# Patient Record
Sex: MALE | Race: BLACK OR AFRICAN AMERICAN | NOT HISPANIC OR LATINO
[De-identification: names, ages, dates, MRNs, and addresses within clinical notes are randomized per-mention and may not be internally consistent; named-entity substitution may affect disease eponyms.]

---

## 2018-10-24 ENCOUNTER — CHARTING TRANS (OUTPATIENT)
Dept: OTHER | Age: 43
End: 2018-10-24

## 2018-10-26 ENCOUNTER — CHARTING TRANS (OUTPATIENT)
Dept: OTHER | Age: 43
End: 2018-10-26

## 2023-04-11 RX ORDER — INSULIN ASPART 100 [IU]/ML
20 INJECTION, SOLUTION INTRAVENOUS; SUBCUTANEOUS
COMMUNITY

## 2023-04-13 ENCOUNTER — HOSPITAL ENCOUNTER (OUTPATIENT)
Facility: HOSPITAL | Age: 48
Setting detail: HOSPITAL OUTPATIENT SURGERY
Discharge: HOME | End: 2023-04-13
Attending: OPHTHALMOLOGY | Admitting: OPHTHALMOLOGY
Payer: MEDICARE

## 2023-04-13 ENCOUNTER — ANESTHESIA (OUTPATIENT)
Dept: SURGERY | Facility: HOSPITAL | Age: 48
End: 2023-04-13
Payer: MEDICARE

## 2023-04-13 ENCOUNTER — ANESTHESIA EVENT (OUTPATIENT)
Dept: SURGERY | Facility: HOSPITAL | Age: 48
End: 2023-04-13
Payer: MEDICARE

## 2023-04-13 VITALS
BODY MASS INDEX: 39.17 KG/M2 | TEMPERATURE: 98 F | HEART RATE: 88 BPM | SYSTOLIC BLOOD PRESSURE: 157 MMHG | RESPIRATION RATE: 16 BRPM | HEIGHT: 75 IN | OXYGEN SATURATION: 95 % | DIASTOLIC BLOOD PRESSURE: 75 MMHG | WEIGHT: 315 LBS

## 2023-04-13 LAB
GLUCOSE BLDC GLUCOMTR-MCNC: 131 MG/DL (ref 70–99)
GLUCOSE BLDC GLUCOMTR-MCNC: 149 MG/DL (ref 70–99)

## 2023-04-13 PROCEDURE — 82962 GLUCOSE BLOOD TEST: CPT

## 2023-04-13 PROCEDURE — 08QF3ZZ REPAIR LEFT RETINA, PERCUTANEOUS APPROACH: ICD-10-PCS | Performed by: OPHTHALMOLOGY

## 2023-04-13 PROCEDURE — 08NF3ZZ RELEASE LEFT RETINA, PERCUTANEOUS APPROACH: ICD-10-PCS | Performed by: OPHTHALMOLOGY

## 2023-04-13 PROCEDURE — 08T53ZZ RESECTION OF LEFT VITREOUS, PERCUTANEOUS APPROACH: ICD-10-PCS | Performed by: OPHTHALMOLOGY

## 2023-04-13 RX ORDER — HYDROMORPHONE HYDROCHLORIDE 1 MG/ML
0.2 INJECTION, SOLUTION INTRAMUSCULAR; INTRAVENOUS; SUBCUTANEOUS EVERY 5 MIN PRN
Status: DISCONTINUED | OUTPATIENT
Start: 2023-04-13 | End: 2023-04-13

## 2023-04-13 RX ORDER — MORPHINE SULFATE 4 MG/ML
2 INJECTION, SOLUTION INTRAMUSCULAR; INTRAVENOUS EVERY 10 MIN PRN
Status: DISCONTINUED | OUTPATIENT
Start: 2023-04-13 | End: 2023-04-13

## 2023-04-13 RX ORDER — ATROPINE SULFATE 10 MG/ML
SOLUTION/ DROPS OPHTHALMIC AS NEEDED
Status: DISCONTINUED | OUTPATIENT
Start: 2023-04-13 | End: 2023-04-13 | Stop reason: HOSPADM

## 2023-04-13 RX ORDER — BALANCED SALT SOLUTION 6.4; .75; .48; .3; 3.9; 1.7 MG/ML; MG/ML; MG/ML; MG/ML; MG/ML; MG/ML
SOLUTION OPHTHALMIC AS NEEDED
Status: DISCONTINUED | OUTPATIENT
Start: 2023-04-13 | End: 2023-04-13 | Stop reason: HOSPADM

## 2023-04-13 RX ORDER — DEXTROSE MONOHYDRATE 25 G/50ML
50 INJECTION, SOLUTION INTRAVENOUS
Status: DISCONTINUED | OUTPATIENT
Start: 2023-04-13 | End: 2023-04-13 | Stop reason: HOSPADM

## 2023-04-13 RX ORDER — NICOTINE POLACRILEX 4 MG
30 LOZENGE BUCCAL
Status: DISCONTINUED | OUTPATIENT
Start: 2023-04-13 | End: 2023-04-13

## 2023-04-13 RX ORDER — NICOTINE POLACRILEX 4 MG
30 LOZENGE BUCCAL
Status: DISCONTINUED | OUTPATIENT
Start: 2023-04-13 | End: 2023-04-13 | Stop reason: HOSPADM

## 2023-04-13 RX ORDER — HYDROCODONE BITARTRATE AND ACETAMINOPHEN 5; 325 MG/1; MG/1
2 TABLET ORAL EVERY 4 HOURS PRN
Status: DISCONTINUED | OUTPATIENT
Start: 2023-04-13 | End: 2023-04-13

## 2023-04-13 RX ORDER — EPHEDRINE SULFATE 50 MG/ML
INJECTION, SOLUTION INTRAVENOUS AS NEEDED
Status: DISCONTINUED | OUTPATIENT
Start: 2023-04-13 | End: 2023-04-13 | Stop reason: SURG

## 2023-04-13 RX ORDER — NICOTINE POLACRILEX 4 MG
15 LOZENGE BUCCAL
Status: DISCONTINUED | OUTPATIENT
Start: 2023-04-13 | End: 2023-04-13

## 2023-04-13 RX ORDER — GENTAMICIN SULFATE 3 MG/ML
2 SOLUTION/ DROPS OPHTHALMIC AS NEEDED
Status: DISCONTINUED | OUTPATIENT
Start: 2023-04-13 | End: 2023-04-13 | Stop reason: HOSPADM

## 2023-04-13 RX ORDER — HYDROMORPHONE HYDROCHLORIDE 1 MG/ML
0.4 INJECTION, SOLUTION INTRAMUSCULAR; INTRAVENOUS; SUBCUTANEOUS EVERY 5 MIN PRN
Status: DISCONTINUED | OUTPATIENT
Start: 2023-04-13 | End: 2023-04-13

## 2023-04-13 RX ORDER — METOCLOPRAMIDE 10 MG/1
10 TABLET ORAL ONCE
Status: COMPLETED | OUTPATIENT
Start: 2023-04-13 | End: 2023-04-13

## 2023-04-13 RX ORDER — MORPHINE SULFATE 10 MG/ML
6 INJECTION, SOLUTION INTRAMUSCULAR; INTRAVENOUS EVERY 10 MIN PRN
Status: DISCONTINUED | OUTPATIENT
Start: 2023-04-13 | End: 2023-04-13

## 2023-04-13 RX ORDER — NALOXONE HYDROCHLORIDE 0.4 MG/ML
80 INJECTION, SOLUTION INTRAMUSCULAR; INTRAVENOUS; SUBCUTANEOUS AS NEEDED
Status: DISCONTINUED | OUTPATIENT
Start: 2023-04-13 | End: 2023-04-13

## 2023-04-13 RX ORDER — FAMOTIDINE 20 MG/1
20 TABLET, FILM COATED ORAL ONCE
Status: COMPLETED | OUTPATIENT
Start: 2023-04-13 | End: 2023-04-13

## 2023-04-13 RX ORDER — HYDROCODONE BITARTRATE AND ACETAMINOPHEN 5; 325 MG/1; MG/1
1 TABLET ORAL EVERY 4 HOURS PRN
Status: DISCONTINUED | OUTPATIENT
Start: 2023-04-13 | End: 2023-04-13

## 2023-04-13 RX ORDER — MORPHINE SULFATE 4 MG/ML
4 INJECTION, SOLUTION INTRAMUSCULAR; INTRAVENOUS EVERY 10 MIN PRN
Status: DISCONTINUED | OUTPATIENT
Start: 2023-04-13 | End: 2023-04-13

## 2023-04-13 RX ORDER — ATROPINE SULFATE 10 MG/ML
1 SOLUTION/ DROPS OPHTHALMIC AS NEEDED
Status: DISCONTINUED | OUTPATIENT
Start: 2023-04-13 | End: 2023-04-13 | Stop reason: HOSPADM

## 2023-04-13 RX ORDER — DEXTROSE MONOHYDRATE 25 G/50ML
50 INJECTION, SOLUTION INTRAVENOUS
Status: DISCONTINUED | OUTPATIENT
Start: 2023-04-13 | End: 2023-04-13

## 2023-04-13 RX ORDER — NICOTINE POLACRILEX 4 MG
15 LOZENGE BUCCAL
Status: DISCONTINUED | OUTPATIENT
Start: 2023-04-13 | End: 2023-04-13 | Stop reason: HOSPADM

## 2023-04-13 RX ORDER — HYDROMORPHONE HYDROCHLORIDE 1 MG/ML
0.6 INJECTION, SOLUTION INTRAMUSCULAR; INTRAVENOUS; SUBCUTANEOUS EVERY 5 MIN PRN
Status: DISCONTINUED | OUTPATIENT
Start: 2023-04-13 | End: 2023-04-13

## 2023-04-13 RX ORDER — SODIUM CHLORIDE, SODIUM LACTATE, POTASSIUM CHLORIDE, CALCIUM CHLORIDE 600; 310; 30; 20 MG/100ML; MG/100ML; MG/100ML; MG/100ML
INJECTION, SOLUTION INTRAVENOUS CONTINUOUS
Status: DISCONTINUED | OUTPATIENT
Start: 2023-04-13 | End: 2023-04-13

## 2023-04-13 RX ORDER — ROCURONIUM BROMIDE 10 MG/ML
INJECTION, SOLUTION INTRAVENOUS AS NEEDED
Status: DISCONTINUED | OUTPATIENT
Start: 2023-04-13 | End: 2023-04-13 | Stop reason: SURG

## 2023-04-13 RX ORDER — ACETAMINOPHEN 500 MG
1000 TABLET ORAL ONCE
Status: COMPLETED | OUTPATIENT
Start: 2023-04-13 | End: 2023-04-13

## 2023-04-13 RX ORDER — LIDOCAINE HYDROCHLORIDE 10 MG/ML
INJECTION, SOLUTION EPIDURAL; INFILTRATION; INTRACAUDAL; PERINEURAL AS NEEDED
Status: DISCONTINUED | OUTPATIENT
Start: 2023-04-13 | End: 2023-04-13 | Stop reason: SURG

## 2023-04-13 RX ORDER — ACETAMINOPHEN 325 MG/1
650 TABLET ORAL EVERY 4 HOURS PRN
Status: DISCONTINUED | OUTPATIENT
Start: 2023-04-13 | End: 2023-04-13

## 2023-04-13 RX ORDER — PHENYLEPHRINE HYDROCHLORIDE 25 MG/ML
2 SOLUTION/ DROPS OPHTHALMIC AS NEEDED
Status: DISCONTINUED | OUTPATIENT
Start: 2023-04-13 | End: 2023-04-13 | Stop reason: HOSPADM

## 2023-04-13 RX ADMIN — ROCURONIUM BROMIDE 30 MG: 10 INJECTION, SOLUTION INTRAVENOUS at 14:06:00

## 2023-04-13 RX ADMIN — ROCURONIUM BROMIDE 10 MG: 10 INJECTION, SOLUTION INTRAVENOUS at 13:55:00

## 2023-04-13 RX ADMIN — EPHEDRINE SULFATE 10 MG: 50 INJECTION, SOLUTION INTRAVENOUS at 14:23:00

## 2023-04-13 RX ADMIN — SODIUM CHLORIDE, SODIUM LACTATE, POTASSIUM CHLORIDE, CALCIUM CHLORIDE: 600; 310; 30; 20 INJECTION, SOLUTION INTRAVENOUS at 14:46:00

## 2023-04-13 RX ADMIN — LIDOCAINE HYDROCHLORIDE 50 MG: 10 INJECTION, SOLUTION EPIDURAL; INFILTRATION; INTRACAUDAL; PERINEURAL at 13:55:00

## 2023-04-13 NOTE — ANESTHESIA PROCEDURE NOTES
Airway  Date/Time: 4/13/2023 1:58 PM  Urgency: Elective    Difficult airway    General Information and Staff    Patient location during procedure: OR  Anesthesiologist: Behzad Serrato MD  Resident/CRNA: Vania Salvador CRNA  Performed: CRNA and anesthesiologist   Performed by: Vania Salvador CRNA  Authorized by: Behzad Serrato MD      Indications and Patient Condition  Indications for airway management: anesthesia  Sedation level: deep  Preoxygenated: yes  Patient position: sniffing  Mask difficulty assessment: 1 - vent by mask    Final Airway Details  Final airway type: endotracheal airway      Successful airway: ETT  Cuffed: yes   Successful intubation technique: Video laryngoscopy  Endotracheal tube insertion site: oral  Blade: Glenn  Blade size: #3  ETT size (mm): 7.5    Cormack-Lehane Classification: grade IIA - partial view of glottis  Placement verified by: chest auscultation and capnometry   Cuff volume (mL): 10  Measured from: lips  ETT to lips (cm): 23  Number of attempts at approach: 1  Number of other approaches attempted: 0

## 2023-04-13 NOTE — H&P
Patient presents for surgical approach to vitreous hemorrhage left eye. S/p laser for PDR and retinal tear. Exam Vitreous hemorrhage.   Patient understands risk of loss of vision loss of  Eye.

## 2023-04-14 NOTE — OPERATIVE REPORT
[facility]    PATIENT'S NAME: MCKAYLAZuhair Medico PHYSICIAN: Roberto Dudley MD   OPERATING PHYSICIAN: Roberto Dudley MD   PATIENT ACCOUNT#:   [de-identified]    LOCATION:  [place] [address]  MEDICAL RECORD #:   [id number]       YOB: 1975  ADMISSION DATE:       04/13/2023      OPERATION DATE:  04/13/2023    OPERATIVE REPORT    #####EDITING MAY BE REQUIRED#####    PREOPERATIVE DIAGNOSIS:  Proliferative diabetic retinopathy, vitreous hemorrhage, retinal tear status post laser photocoagulation, left eye. POSTOPERATIVE DIAGNOSIS:  Proliferative diabetic retinopathy, vitreous hemorrhage, retinal tear status post laser photocoagulation, left eye. PROCEDURE:  Vitrectomy, membrane peeling, laser photocoagulation, left eye. OPERATIVE TECHNIQUE:  Patient was placed supine on the table. Patient's left eye was prepped in the usual fashion. The 25-gauge plus trocars were placed in inferotemporal, superotemporal, superonasal quadrants. The infusion cannula's position was confirmed and infusion begun. Pars plana vitrectomy was performed. Vitreous hemorrhage was aspirated and cut without complication. Epiretinal membranes extended to patches of neovascularization superiorly and connecting to the optic nerve. A preexistent retinal hole with excellent laser photocoagulation surrounding the hole was present in the inferonasal quadrant. This was not disturbed. Using the wide-angle viewing system and contact lens, membranes were delaminated without complication. Neovascularization of the disc was removed without difficulty. Notably, the circulation in the eye was extremely attenuated. The optic nerve appeared slightly pale. Laser ______ was scattered about the periphery. Ophthalmoscopy showed no holes or tears. Trocars removed without leak. The eye was dressed with atropine, antibiotic ointment, and a pressure patch. No complications.     Dictated By Jennifer Lara Forrest Reynoso MD  d: 04/13/2023 14:50:37  t: 04/13/2023 19:14:24  [facility] [id number]  [facility] [place]/

## 2023-11-12 VITALS
HEIGHT: 75 IN | OXYGEN SATURATION: 99 % | RESPIRATION RATE: 22 BRPM | SYSTOLIC BLOOD PRESSURE: 154 MMHG | HEART RATE: 88 BPM | DIASTOLIC BLOOD PRESSURE: 105 MMHG | WEIGHT: 315 LBS | TEMPERATURE: 98 F | BODY MASS INDEX: 39.17 KG/M2

## 2023-11-12 PROCEDURE — 36415 COLL VENOUS BLD VENIPUNCTURE: CPT

## 2023-11-12 PROCEDURE — 99283 EMERGENCY DEPT VISIT LOW MDM: CPT

## 2023-11-12 PROCEDURE — 99284 EMERGENCY DEPT VISIT MOD MDM: CPT

## 2023-11-13 ENCOUNTER — HOSPITAL ENCOUNTER (EMERGENCY)
Facility: HOSPITAL | Age: 48
Discharge: HOME | End: 2023-11-13
Attending: EMERGENCY MEDICINE
Payer: MEDICARE

## 2023-11-13 DIAGNOSIS — R73.9 HYPERGLYCEMIA: ICD-10-CM

## 2023-11-13 DIAGNOSIS — S81.802A OPEN WOUND OF LEFT LOWER EXTREMITY, INITIAL ENCOUNTER: Primary | ICD-10-CM

## 2023-11-13 LAB
ANION GAP SERPL CALC-SCNC: 7 MMOL/L (ref 0–18)
BASOPHILS # BLD AUTO: 0.03 X10(3) UL (ref 0–0.2)
BASOPHILS NFR BLD AUTO: 0.4 %
BUN BLD-MCNC: 17 MG/DL (ref 9–23)
BUN/CREAT SERPL: 9.6 (ref 10–20)
CALCIUM BLD-MCNC: 8.8 MG/DL (ref 8.7–10.4)
CHLORIDE SERPL-SCNC: 98 MMOL/L (ref 98–112)
CO2 SERPL-SCNC: 26 MMOL/L (ref 21–32)
CREAT BLD-MCNC: 1.77 MG/DL
DEPRECATED RDW RBC AUTO: 42.1 FL (ref 35.1–46.3)
EGFRCR SERPLBLD CKD-EPI 2021: 47 ML/MIN/1.73M2 (ref 60–?)
EOSINOPHIL # BLD AUTO: 0.3 X10(3) UL (ref 0–0.7)
EOSINOPHIL NFR BLD AUTO: 4.4 %
ERYTHROCYTE [DISTWIDTH] IN BLOOD BY AUTOMATED COUNT: 14.7 % (ref 11–15)
GLUCOSE BLD-MCNC: 444 MG/DL (ref 70–99)
GLUCOSE BLDC GLUCOMTR-MCNC: 390 MG/DL (ref 70–99)
GLUCOSE BLDC GLUCOMTR-MCNC: 396 MG/DL (ref 70–99)
HCT VFR BLD AUTO: 36.8 %
HGB BLD-MCNC: 12 G/DL
IMM GRANULOCYTES # BLD AUTO: 0.03 X10(3) UL (ref 0–1)
IMM GRANULOCYTES NFR BLD: 0.4 %
LYMPHOCYTES # BLD AUTO: 2.47 X10(3) UL (ref 1–4)
LYMPHOCYTES NFR BLD AUTO: 36.4 %
MCH RBC QN AUTO: 25.5 PG (ref 26–34)
MCHC RBC AUTO-ENTMCNC: 32.6 G/DL (ref 31–37)
MCV RBC AUTO: 78.3 FL
MONOCYTES # BLD AUTO: 0.5 X10(3) UL (ref 0.1–1)
MONOCYTES NFR BLD AUTO: 7.4 %
NEUTROPHILS # BLD AUTO: 3.46 X10 (3) UL (ref 1.5–7.7)
NEUTROPHILS # BLD AUTO: 3.46 X10(3) UL (ref 1.5–7.7)
NEUTROPHILS NFR BLD AUTO: 51 %
OSMOLALITY SERPL CALC.SUM OF ELEC: 293 MOSM/KG (ref 275–295)
PLATELET # BLD AUTO: 284 10(3)UL (ref 150–450)
POTASSIUM SERPL-SCNC: 4.3 MMOL/L (ref 3.5–5.1)
RBC # BLD AUTO: 4.7 X10(6)UL
SODIUM SERPL-SCNC: 131 MMOL/L (ref 136–145)
WBC # BLD AUTO: 6.8 X10(3) UL (ref 4–11)

## 2023-11-13 PROCEDURE — 85025 COMPLETE CBC W/AUTO DIFF WBC: CPT | Performed by: EMERGENCY MEDICINE

## 2023-11-13 PROCEDURE — 87070 CULTURE OTHR SPECIMN AEROBIC: CPT | Performed by: EMERGENCY MEDICINE

## 2023-11-13 PROCEDURE — 82962 GLUCOSE BLOOD TEST: CPT

## 2023-11-13 PROCEDURE — 87077 CULTURE AEROBIC IDENTIFY: CPT | Performed by: EMERGENCY MEDICINE

## 2023-11-13 PROCEDURE — 80048 BASIC METABOLIC PNL TOTAL CA: CPT | Performed by: EMERGENCY MEDICINE

## 2023-11-13 PROCEDURE — 87205 SMEAR GRAM STAIN: CPT | Performed by: EMERGENCY MEDICINE

## 2023-11-13 PROCEDURE — 87186 SC STD MICRODIL/AGAR DIL: CPT | Performed by: EMERGENCY MEDICINE

## 2023-11-13 PROCEDURE — 87147 CULTURE TYPE IMMUNOLOGIC: CPT | Performed by: EMERGENCY MEDICINE

## 2023-11-13 RX ORDER — CLINDAMYCIN HYDROCHLORIDE 300 MG/1
600 CAPSULE ORAL 3 TIMES DAILY
Qty: 60 CAPSULE | Refills: 0 | Status: SHIPPED | OUTPATIENT
Start: 2023-11-13 | End: 2023-11-23

## 2023-11-13 RX ORDER — INSULIN ASPART 100 [IU]/ML
0.2 INJECTION, SOLUTION INTRAVENOUS; SUBCUTANEOUS ONCE
Status: COMPLETED | OUTPATIENT
Start: 2023-11-13 | End: 2023-11-13

## 2023-11-13 NOTE — ED INITIAL ASSESSMENT (HPI)
Patient arrived from home, states left leg swelling to stump (amputation) recent wound vac, and right lower leg was cut 1 month ago, leg now weeping, red and warm to touch. Denies pain, hx of DM.

## 2023-11-16 RX ORDER — SULFAMETHOXAZOLE AND TRIMETHOPRIM 800; 160 MG/1; MG/1
1 TABLET ORAL 2 TIMES DAILY
Qty: 14 TABLET | Refills: 0 | Status: SHIPPED | OUTPATIENT
Start: 2023-11-16 | End: 2023-11-23

## 2023-11-16 NOTE — PROGRESS NOTES
ED Culture Callback Results Review  Pharmacist reviewed culture results from ED visit     Final wound positive for  group B strep and proteus that is not susceptible to previously prescribed  Clindamycin (Cleocin) therapy. A new prescription for Bactrim 1 DS tab PO BID x 7 days was electronically sent to [facility] as discussed with Dr. Kathy Le. patient was contacted by phone, informed of the results, and educated regarding the change in therapy. patient verbalized understanding of the treatment plan and all questions were answered.         Santiago Woods, PharmD, PharmD   Emergency Medicine Pharmacist Specialist  11/16/23; 2:56 PM

## 2025-02-22 ENCOUNTER — HOSPITAL ENCOUNTER (INPATIENT)
Facility: HOSPITAL | Age: 50
End: 2025-02-22
Attending: EMERGENCY MEDICINE | Admitting: INTERNAL MEDICINE
Payer: MEDICARE

## 2025-02-22 ENCOUNTER — APPOINTMENT (OUTPATIENT)
Dept: ULTRASOUND IMAGING | Facility: HOSPITAL | Age: 50
End: 2025-02-22
Attending: EMERGENCY MEDICINE
Payer: MEDICARE

## 2025-02-22 ENCOUNTER — HOSPITAL ENCOUNTER (INPATIENT)
Facility: HOSPITAL | Age: 50
LOS: 9 days | Discharge: HOME HEALTH | End: 2025-03-04
Attending: EMERGENCY MEDICINE | Admitting: INTERNAL MEDICINE
Payer: MEDICARE

## 2025-02-22 DIAGNOSIS — M79.89 LEG SWELLING: Primary | ICD-10-CM

## 2025-02-22 DIAGNOSIS — L02.91 ABSCESS: ICD-10-CM

## 2025-02-22 LAB
ANION GAP SERPL CALC-SCNC: 7 MMOL/L (ref 0–18)
BASOPHILS # BLD AUTO: 0.05 X10(3) UL (ref 0–0.2)
BASOPHILS NFR BLD AUTO: 0.5 %
BUN BLD-MCNC: 18 MG/DL (ref 9–23)
BUN/CREAT SERPL: 9 (ref 10–20)
CALCIUM BLD-MCNC: 8.4 MG/DL (ref 8.7–10.4)
CHLORIDE SERPL-SCNC: 105 MMOL/L (ref 98–112)
CO2 SERPL-SCNC: 26 MMOL/L (ref 21–32)
CREAT BLD-MCNC: 2.01 MG/DL
DEPRECATED RDW RBC AUTO: 48.6 FL (ref 35.1–46.3)
EGFRCR SERPLBLD CKD-EPI 2021: 40 ML/MIN/1.73M2 (ref 60–?)
EOSINOPHIL # BLD AUTO: 0.22 X10(3) UL (ref 0–0.7)
EOSINOPHIL NFR BLD AUTO: 2.2 %
ERYTHROCYTE [DISTWIDTH] IN BLOOD BY AUTOMATED COUNT: 16.5 % (ref 11–15)
GLUCOSE BLD-MCNC: 104 MG/DL (ref 70–99)
HCT VFR BLD AUTO: 37.8 %
HGB BLD-MCNC: 12.4 G/DL
IMM GRANULOCYTES # BLD AUTO: 0.03 X10(3) UL (ref 0–1)
IMM GRANULOCYTES NFR BLD: 0.3 %
LYMPHOCYTES # BLD AUTO: 1.97 X10(3) UL (ref 1–4)
LYMPHOCYTES NFR BLD AUTO: 19.8 %
MCH RBC QN AUTO: 26.5 PG (ref 26–34)
MCHC RBC AUTO-ENTMCNC: 32.8 G/DL (ref 31–37)
MCV RBC AUTO: 80.8 FL
MONOCYTES # BLD AUTO: 0.83 X10(3) UL (ref 0.1–1)
MONOCYTES NFR BLD AUTO: 8.4 %
NEUTROPHILS # BLD AUTO: 6.83 X10 (3) UL (ref 1.5–7.7)
NEUTROPHILS # BLD AUTO: 6.83 X10(3) UL (ref 1.5–7.7)
NEUTROPHILS NFR BLD AUTO: 68.8 %
OSMOLALITY SERPL CALC.SUM OF ELEC: 288 MOSM/KG (ref 275–295)
PLATELET # BLD AUTO: 297 10(3)UL (ref 150–450)
POTASSIUM SERPL-SCNC: 4.5 MMOL/L (ref 3.5–5.1)
RBC # BLD AUTO: 4.68 X10(6)UL
SODIUM SERPL-SCNC: 138 MMOL/L (ref 136–145)
WBC # BLD AUTO: 9.9 X10(3) UL (ref 4–11)

## 2025-02-22 PROCEDURE — 36415 COLL VENOUS BLD VENIPUNCTURE: CPT

## 2025-02-22 PROCEDURE — 93971 EXTREMITY STUDY: CPT | Performed by: EMERGENCY MEDICINE

## 2025-02-22 PROCEDURE — 85025 COMPLETE CBC W/AUTO DIFF WBC: CPT | Performed by: EMERGENCY MEDICINE

## 2025-02-22 PROCEDURE — 99285 EMERGENCY DEPT VISIT HI MDM: CPT

## 2025-02-22 PROCEDURE — 96376 TX/PRO/DX INJ SAME DRUG ADON: CPT

## 2025-02-22 PROCEDURE — 80048 BASIC METABOLIC PNL TOTAL CA: CPT | Performed by: EMERGENCY MEDICINE

## 2025-02-22 PROCEDURE — 96375 TX/PRO/DX INJ NEW DRUG ADDON: CPT

## 2025-02-22 RX ORDER — VANCOMYCIN HYDROCHLORIDE
15 ONCE
Status: COMPLETED | OUTPATIENT
Start: 2025-02-23 | End: 2025-02-23

## 2025-02-23 PROBLEM — L02.91 ABSCESS: Status: ACTIVE | Noted: 2025-02-23

## 2025-02-23 LAB
ANION GAP SERPL CALC-SCNC: 9 MMOL/L (ref 0–18)
BASOPHILS # BLD AUTO: 0.03 X10(3) UL (ref 0–0.2)
BASOPHILS NFR BLD AUTO: 0.3 %
BUN BLD-MCNC: 17 MG/DL (ref 9–23)
BUN/CREAT SERPL: 8.6 (ref 10–20)
CALCIUM BLD-MCNC: 8 MG/DL (ref 8.7–10.4)
CHLORIDE SERPL-SCNC: 107 MMOL/L (ref 98–112)
CO2 SERPL-SCNC: 24 MMOL/L (ref 21–32)
CREAT BLD-MCNC: 1.98 MG/DL
DEPRECATED RDW RBC AUTO: 48.7 FL (ref 35.1–46.3)
EGFRCR SERPLBLD CKD-EPI 2021: 40 ML/MIN/1.73M2 (ref 60–?)
EOSINOPHIL # BLD AUTO: 0.31 X10(3) UL (ref 0–0.7)
EOSINOPHIL NFR BLD AUTO: 3.6 %
ERYTHROCYTE [DISTWIDTH] IN BLOOD BY AUTOMATED COUNT: 16.4 % (ref 11–15)
EST. AVERAGE GLUCOSE BLD GHB EST-MCNC: 148 MG/DL (ref 68–126)
GLUCOSE BLD-MCNC: 123 MG/DL (ref 70–99)
GLUCOSE BLDC GLUCOMTR-MCNC: 105 MG/DL (ref 70–99)
GLUCOSE BLDC GLUCOMTR-MCNC: 116 MG/DL (ref 70–99)
GLUCOSE BLDC GLUCOMTR-MCNC: 141 MG/DL (ref 70–99)
GLUCOSE BLDC GLUCOMTR-MCNC: 149 MG/DL (ref 70–99)
GLUCOSE BLDC GLUCOMTR-MCNC: 188 MG/DL (ref 70–99)
HBA1C MFR BLD: 6.8 % (ref ?–5.7)
HCT VFR BLD AUTO: 33.9 %
HGB BLD-MCNC: 11.1 G/DL
IMM GRANULOCYTES # BLD AUTO: 0.02 X10(3) UL (ref 0–1)
IMM GRANULOCYTES NFR BLD: 0.2 %
LYMPHOCYTES # BLD AUTO: 1.77 X10(3) UL (ref 1–4)
LYMPHOCYTES NFR BLD AUTO: 20.5 %
MCH RBC QN AUTO: 26.4 PG (ref 26–34)
MCHC RBC AUTO-ENTMCNC: 32.7 G/DL (ref 31–37)
MCV RBC AUTO: 80.7 FL
MONOCYTES # BLD AUTO: 0.87 X10(3) UL (ref 0.1–1)
MONOCYTES NFR BLD AUTO: 10.1 %
NEUTROPHILS # BLD AUTO: 5.63 X10 (3) UL (ref 1.5–7.7)
NEUTROPHILS # BLD AUTO: 5.63 X10(3) UL (ref 1.5–7.7)
NEUTROPHILS NFR BLD AUTO: 65.3 %
OSMOLALITY SERPL CALC.SUM OF ELEC: 293 MOSM/KG (ref 275–295)
PLATELET # BLD AUTO: 279 10(3)UL (ref 150–450)
POTASSIUM SERPL-SCNC: 3.9 MMOL/L (ref 3.5–5.1)
RBC # BLD AUTO: 4.2 X10(6)UL
SODIUM SERPL-SCNC: 140 MMOL/L (ref 136–145)
WBC # BLD AUTO: 8.6 X10(3) UL (ref 4–11)

## 2025-02-23 PROCEDURE — 82962 GLUCOSE BLOOD TEST: CPT

## 2025-02-23 PROCEDURE — 83036 HEMOGLOBIN GLYCOSYLATED A1C: CPT | Performed by: INTERNAL MEDICINE

## 2025-02-23 PROCEDURE — 85025 COMPLETE CBC W/AUTO DIFF WBC: CPT | Performed by: INTERNAL MEDICINE

## 2025-02-23 PROCEDURE — 96365 THER/PROPH/DIAG IV INF INIT: CPT

## 2025-02-23 PROCEDURE — 80048 BASIC METABOLIC PNL TOTAL CA: CPT | Performed by: INTERNAL MEDICINE

## 2025-02-23 PROCEDURE — 87040 BLOOD CULTURE FOR BACTERIA: CPT | Performed by: EMERGENCY MEDICINE

## 2025-02-23 RX ORDER — DOXEPIN HYDROCHLORIDE 50 MG/1
1 CAPSULE ORAL DAILY
Status: DISCONTINUED | OUTPATIENT
Start: 2025-02-23 | End: 2025-03-04

## 2025-02-23 RX ORDER — METOCLOPRAMIDE HYDROCHLORIDE 5 MG/ML
10 INJECTION INTRAMUSCULAR; INTRAVENOUS 3 TIMES DAILY PRN
Status: DISCONTINUED | OUTPATIENT
Start: 2025-02-23 | End: 2025-02-23

## 2025-02-23 RX ORDER — LOSARTAN POTASSIUM 50 MG/1
50 TABLET ORAL DAILY
COMMUNITY
Start: 2024-12-09 | End: 2025-12-09

## 2025-02-23 RX ORDER — HYDROMORPHONE HYDROCHLORIDE 1 MG/ML
1 INJECTION, SOLUTION INTRAMUSCULAR; INTRAVENOUS; SUBCUTANEOUS EVERY 4 HOURS PRN
Status: DISCONTINUED | OUTPATIENT
Start: 2025-02-23 | End: 2025-03-01

## 2025-02-23 RX ORDER — METOCLOPRAMIDE HYDROCHLORIDE 5 MG/ML
10 INJECTION INTRAMUSCULAR; INTRAVENOUS 3 TIMES DAILY
Status: DISCONTINUED | OUTPATIENT
Start: 2025-02-23 | End: 2025-02-23

## 2025-02-23 RX ORDER — ATORVASTATIN CALCIUM 40 MG/1
40 TABLET, FILM COATED ORAL DAILY
COMMUNITY
Start: 2024-03-14

## 2025-02-23 RX ORDER — HYDROMORPHONE HYDROCHLORIDE 1 MG/ML
0.5 INJECTION, SOLUTION INTRAMUSCULAR; INTRAVENOUS; SUBCUTANEOUS EVERY 6 HOURS PRN
Status: DISCONTINUED | OUTPATIENT
Start: 2025-02-23 | End: 2025-02-23

## 2025-02-23 RX ORDER — AMLODIPINE BESYLATE 5 MG/1
5 TABLET ORAL DAILY
Status: DISCONTINUED | OUTPATIENT
Start: 2025-02-23 | End: 2025-03-04

## 2025-02-23 RX ORDER — LOSARTAN POTASSIUM 50 MG/1
50 TABLET ORAL DAILY
Status: DISCONTINUED | OUTPATIENT
Start: 2025-02-23 | End: 2025-03-04

## 2025-02-23 RX ORDER — HYDROMORPHONE HYDROCHLORIDE 1 MG/ML
0.5 INJECTION, SOLUTION INTRAMUSCULAR; INTRAVENOUS; SUBCUTANEOUS
Status: DISCONTINUED | OUTPATIENT
Start: 2025-02-23 | End: 2025-02-23

## 2025-02-23 RX ORDER — VANCOMYCIN HYDROCHLORIDE
15 EVERY 24 HOURS
Status: DISCONTINUED | OUTPATIENT
Start: 2025-02-23 | End: 2025-02-26

## 2025-02-23 RX ORDER — AMLODIPINE BESYLATE 5 MG/1
5 TABLET ORAL DAILY
COMMUNITY
Start: 2024-09-04

## 2025-02-23 RX ORDER — METOCLOPRAMIDE HYDROCHLORIDE 5 MG/ML
5 INJECTION INTRAMUSCULAR; INTRAVENOUS 3 TIMES DAILY PRN
Status: DISCONTINUED | OUTPATIENT
Start: 2025-02-23 | End: 2025-03-04

## 2025-02-23 RX ORDER — INSULIN ASPART 100 [IU]/ML
20 INJECTION, SOLUTION INTRAVENOUS; SUBCUTANEOUS
Status: DISCONTINUED | OUTPATIENT
Start: 2025-02-23 | End: 2025-02-23

## 2025-02-23 RX ORDER — ATORVASTATIN CALCIUM 40 MG/1
40 TABLET, FILM COATED ORAL NIGHTLY
Status: DISCONTINUED | OUTPATIENT
Start: 2025-02-23 | End: 2025-03-04

## 2025-02-23 RX ORDER — ONDANSETRON 2 MG/ML
4 INJECTION INTRAMUSCULAR; INTRAVENOUS EVERY 6 HOURS PRN
Status: DISCONTINUED | OUTPATIENT
Start: 2025-02-23 | End: 2025-03-04

## 2025-02-23 NOTE — PLAN OF CARE
Alejandro continues with IV antibiotics. ID following. Voiding. IV dilaudid as needed for pain. Monitoring blood sugars, requesting insulin based on accu check. Dr. Keller aware. Plan for home when cleared for discharge.     Problem: Patient Centered Care  Goal: Patient preferences are identified and integrated in the patient's plan of care  Description: Interventions:  - What would you like us to know as we care for you? \"I have two grand daughters.\"  - Provide timely, complete, and accurate information to patient/family  - Incorporate patient and family knowledge, values, beliefs, and cultural backgrounds into the planning and delivery of care  - Encourage patient/family to participate in care and decision-making at the level they choose  - Honor patient and family perspectives and choices  Outcome: Progressing        Problem: PAIN - ADULT  Goal: Verbalizes/displays adequate comfort level or patient's stated pain goal  Description: INTERVENTIONS:  - Encourage pt to monitor pain and request assistance  - Assess pain using appropriate pain scale  - Administer analgesics based on type and severity of pain and evaluate response  - Implement non-pharmacological measures as appropriate and evaluate response  - Consider cultural and social influences on pain and pain management  - Manage/alleviate anxiety  - Utilize distraction and/or relaxation techniques  - Monitor for opioid side effects  - Notify MD/LIP if interventions unsuccessful or patient reports new pain  - Anticipate increased pain with activity and pre-medicate as appropriate  Outcome: Progressing

## 2025-02-23 NOTE — ED PROVIDER NOTES
Patient Seen in: [facility]    History     Chief Complaint   Patient presents with    Swelling Edema       HPI    History is provided by patient/independent historian: Patient  49 year old male with history of remote left BKA, with subsequent infection with stump osteomyelitis and MRSA abscess s/p course of oral antibiotics, off for the last month, here with complaints of left leg stump swelling.  Patient states that he has a history of a fluid collection there that was drained and needed  antibiotics for a long time.  He feels like the same thing is happening.  He has some scabs overlying his stump that he pulled off and believes that is how it got infected.  No fevers, no spontaneous drainage.    History reviewed.   Past Medical History:    Deep vein thrombosis (HCC)    from PICC line; no anticoagulants    Diabetes (HCC)    Visual impairment    L eye; glasses         History reviewed.   Past Surgical History:   Procedure Laterality Date    Below knee leg amputation Left 2019    estimated         Home Medications reviewed :  Prescriptions Prior to Admission[1]      History reviewed.   Social History     Socioeconomic History    Marital status: Legally    Tobacco Use    Smoking status: Never    Smokeless tobacco: Never   Vaping Use    Vaping status: Never Used   Substance and Sexual Activity    Alcohol use: Not Currently    Drug use: Never         ROS  Review of Systems   Respiratory:  Negative for shortness of breath.    Cardiovascular:  Negative for chest pain.   Skin:  Positive for wound.   All other systems reviewed and are negative.     All other pertinent organ systems are reviewed and are negative.      Physical Exam     ED Triage Vitals [02/22/25 2103]   BP (!) 147/98   Pulse 97   Resp 18   Temp 98.6 °F (37 °C)   Temp src Oral   SpO2 98 %   O2 Device None (Room air)     Vital signs reviewed.      Physical Exam  Vitals and nursing note reviewed.   Cardiovascular:       Pulses: Normal pulses.   Pulmonary:      Effort: No respiratory distress.   Abdominal:      General: There is no distension.   Musculoskeletal:      Comments: Status post left BKA, stump with overlying superficial wound, no spontaneous drainage, no surrounding cellulitis, not significantly warm to touch   Neurological:      Mental Status: He is alert.         ED Course       Labs:     Labs Reviewed   CBC WITH DIFFERENTIAL WITH PLATELET - Abnormal; Notable for the following components:       Result Value    HGB 12.4 (*)     HCT 37.8 (*)     RDW-SD 48.6 (*)     RDW 16.5 (*)     All other components within normal limits   BASIC METABOLIC PANEL (8) - Abnormal; Notable for the following components:    Glucose 104 (*)     Creatinine 2.01 (*)     BUN/CREA Ratio 9.0 (*)     Calcium, Total 8.4 (*)     eGFR-Cr 40 (*)     All other components within normal limits   BLOOD CULTURE   BLOOD CULTURE         My EKG Interpretation:   As reviewed and Interpreted by me      Imaging Results Available and Reviewed while in ED:   US VENOUS DOPPLER LEG LEFT - DIAG IMG (CPT=93971)    Result Date: 2/22/2025  CONCLUSION:  1. No left lower extremity DVT. 2. Edema and a small complex fluid collection in the soft tissues adjacent to the amputation stump     Dictated by (CST): Jeremiah Liang MD on 2/22/2025 at 11:13 PM     Finalized by (CST): Jeremiah Liang MD on 2/22/2025 at 11:16 PM         My review and independent interpretation of US images: + fluid collection. Radiology report corroborates this in addition to other details as reported by them.      Decision rules/scores evaluated: none      Diagnostic labs/tests considered but not ordered: aerobic culture    ED Medications Administered:   Medications   vancomycin (Vancocin) 1.5 g in sodium chloride 0.9% 250mL IVPB premix (has no administration in time range)   piperacillin-tazobactam (Zosyn) 4.5 g in dextrose 5% 100 mL IVPB-ADDV (has no administration in time range)   fentaNYL (Sublimaze)  50 mcg/mL injection 50 mcg (50 mcg Intravenous Given 2/22/25 9484)   fentaNYL (Sublimaze) 50 mcg/mL injection 50 mcg (50 mcg Intravenous Given 2/22/25 8947)            - no overlying fluctuance to  guide aspiration - will start broad spectrum antibiotics    MDM       Medical Decision Making      Differential Diagnosis: After obtaining the patient's history, performing the physical exam and reviewing the diagnostics, multiple initial diagnoses were considered based on the presenting problem including DVT, peripheral edema, fluid collection, cellulitis    External document review: I personally reviewed available external medical records for any recent pertinent discharge summaries, testing, and procedures - the findings are as follows: 12/1/24 admission for LLE cellulitis    Complicating Factors: The patient already  has a past medical history of Deep vein thrombosis (HCC), Diabetes (HCC), and Visual impairment. to contribute to the complexity of this ED evaluation.    Procedures performed: none    Discussed management with physician/appropriate source: Dr. Keller, Dr. William    Considered admission/deescalation of care for: none    Social determinants of health affecting patient care: none    Prescription medications considered: discussed continuing current medication regimen    The patient requires continuous monitoring for: left leg swelling    Shared decision making: discussed possible admission      Disposition and Plan     Clinical Impression:  1. Leg swelling    2. Abscess        Disposition:  Admit    Follow-up:  No follow-up provider specified.    Medications Prescribed:  Current Discharge Medication List          Hospital Problems       Present on Admission             ICD-10-CM Noted POA    * (Principal) Leg swelling M79.89 2/22/2025 Unknown                     [1] (Not in a hospital admission)

## 2025-02-23 NOTE — CONSULTS
[facility]  part of [facility] ID CONSULT NOTE    Alejandro Mireles Jr. Patient Status:  Inpatient    1975 MRN [id number]   Location [facility] 4W/SW/SE Attending Mary Keller MD   Hosp Day # 0 PCP Fernanda Chapman MD       Reason for Consultation:  L BKA infection    ASSESSMENT:    Antibiotics: Vancomycin, Cefepime    # L BKA stump abscess    -  MRI  with distal tibia OM involving osteotomy margin + abscess (1.9 x 1.8cm) w/ sinus tract to skin, s/p bedside aspiration, cx ngtd; drainage cx- S.agalactiae, diptheroids   -  Refused IV abx given hx PICC assc DVT, s/p augmentin through    - Seen again on doppler US  (4.8 x 5.3 x 4.5cm)    # Hx R TMA abscess 2024- s/p I&D, ertapenem, dc 24  # Hx BLE OM (s/p L BKA , R TMA )    # DM, CKD  # HTN     PLAN:    -  Start iv vancomycin + cefepime  -  FU bcx  -  Ortho eval   -  Local wound care. BS control.  -  Follow fever curve, wbc  -  Reviewed labs, micro, imaging reports, available old records  -  D/w patient, RN    History of Present Illness:  Alejandro Mireles Jr. is a a(n) 49 year old male with PMH HTN, DM w/neuropathy/nephropathy, PAD, LLE OM (s/p L BKA ), R forefoot OM (s/p R TMA ), c/b R TMA OM/abscess (s/p I&D 24, ertapenem (EOT 24)c/b PICC assc RUE DVT))    Pt admitted at [facility] in 2024 with L BKA stump OM and abscess s/p bedside aspiration, cx ngtd. Admit drainage cx w/S.agalactiae and diptheroids. Was on zosyn. Pt was unwilling to undergo another course of iv abx d/t hx DVT and thus DC on augmentin through 25. Now presents to [facility] ER  with L stump swelling. He believes that these recurrent infections keep happening because of excessive sweating while wearing his prosthetic. There was also a scab on his stump that he recently picked off. Endorses stump associated pain and swelling but reported drainage. No fevers or chills. Pt does not want any invasive  surgical procedures or iv antibiotics. On admit, no fever. WBC 9.9. LLE dopplers neg for DVT but with abscess (4.8 x 5.3 x 4.5 cm). Bcx sent. Pt given 1x dose vancomycin + zosyn. ID consulted.     History:  Past Medical History:    Deep vein thrombosis (HCC)    from PICC line; no anticoagulants    Diabetes (HCC)    Visual impairment    L eye; glasses     Past Surgical History:   Procedure Laterality Date    Below knee leg amputation Left 2019    estimated     No family history on file.   reports that he has never smoked. He has never used smokeless tobacco. He reports that he does not currently use alcohol. He reports that he does not use drugs.    Allergies:  Allergies[1]    Medications:    Current Facility-Administered Medications:     amLODIPine (Norvasc) tab 5 mg, 5 mg, Oral, Daily    atorvastatin (Lipitor) tab 40 mg, 40 mg, Oral, Nightly    losartan (Cozaar) tab 50 mg, 50 mg, Oral, Daily    insulin aspart (NovoLOG) 100 Units/mL FlexPen 1-5 Units, 1-5 Units, Subcutaneous, TID CC    HYDROmorphone (Dilaudid) 1 MG/ML injection 0.5 mg, 0.5 mg, Intravenous, Q6H PRN    Review of Systems:  CONSTITUTIONAL:  No weight loss, weakness or fatigue.  HEENT:  Eyes:  No visual loss, blurred vision, double vision or yellow sclerae. Ears, Nose, Throat:  No hearing loss, sneezing, congestion, runny nose or sore throat.  SKIN:  No rash or itching.  CARDIOVASCULAR:  No chest pain, chest pressure or chest discomfort  RESPIRATORY:  No shortness of breath, cough or sputum.  GASTROINTESTINAL:  No anorexia, nausea, vomiting or diarrhea. No abdominal pain or blood.  GENITOURINARY:  No Burning on urination.   NEUROLOGICAL:  No headache, dizziness, syncope, paralysis, ataxia, numbness or tingling in the extremities.  MUSCULOSKELETAL:  No muscle, back pain, LLE swelling + pain    Physical Exam:  Vital signs: Blood pressure 136/83, pulse 90, temperature 98.2 °F (36.8 °C), temperature source Oral, resp. rate (!) 148, height 6' 3\" (1.905 m),  weight 278 lb (126.1 kg), SpO2 98%.    General: Alert, oriented, NAD  HEENT: Moist mucous membranes. EOMI  Neck: No lymphadenopathy.  Supple.  Cardiovascular: RRR  Respiratory: Symmetric expansion, unlabored breathing  Abdomen: Soft, nontender, nondistended.   Musculoskeletal: no sig edema  Integument: No erythema, chronic LE skin changes, R TMA well healed, L BKA stump with slight swelling, distal dime sized wound w/no drainage expressed, no erythema, min ttp     Laboratory Data: Reviewed in EMR    Microbiology: Reviewed in EMR    Radiology: Reviewed    Thank you for allowing us to participate in the care of this patient. Please do not hesitate to call if you have any questions.     We will continue to follow with you and will make further recommendations based on his progress.    SUSAN [facility]  [phone]  2/23/2025          [1] No Known Allergies

## 2025-02-23 NOTE — H&P
[facility]  part of [facility]    History and Physical    Alejandro Mireles Jr. Patient Status:  Inpatient    1975 MRN [id number]   Location [facility] 4W/SW/SE Attending Mary Keller MD   Hosp Day # 0 PCP Fernanda Chapman MD     Date:  2025  Date of Admission:  2025    History provided by:patient  HPI:     Chief Complaint   Patient presents with    Swelling Edema     49 year old male PMH of remote left BKA, with subsequent infection with stump osteomyelitis and MRSA abscess s/p course of oral antibiotics, off for the last month, here with complaints of left leg stump swelling.  Patient states that he has a history of a fluid collection there that was drained and needed  antibiotics for a long time.  He feels like the same thing is happening.  He has some scabs overlying his stump that he pulled off and believes that is how it got infected.  No fevers, no spontaneous drainage.          History     Past Medical History:    Deep vein thrombosis (HCC)    from PICC line; no anticoagulants    Diabetes (HCC)    Visual impairment    L eye; glasses     Past Surgical History:   Procedure Laterality Date    Below knee leg amputation Left     estimated     No family history on file.  Social History:  Social History     Socioeconomic History    Marital status: Legally    Tobacco Use    Smoking status: Never    Smokeless tobacco: Never   Vaping Use    Vaping status: Never Used   Substance and Sexual Activity    Alcohol use: Not Currently    Drug use: Never     Social Drivers of Health     Food Insecurity: No Food Insecurity (2025)    NCSS - Food Insecurity     Worried About Running Out of Food in the Last Year: No     Ran Out of Food in the Last Year: No   Transportation Needs: No Transportation Needs (2025)    NCSS - Transportation     Lack of Transportation: No   Housing Stability: Not At Risk (2025)    NCSS - Housing/Utilities     Has Housing:  Yes     Worried About Losing Housing: No     Unable to Get Utilities: No     Allergies/Medications:   Allergies: Allergies[1]  Medications Prior to Admission   Medication Sig    losartan 50 MG Oral Tab Take 1 tablet (50 mg total) by mouth daily.    amLODIPine 5 MG Oral Tab Take 1 tablet (5 mg total) by mouth daily.    insulin NPH & regular 70/30 (70-30) 100 Units/mL Subcutaneous Suspension Inject 20 Units into the skin Before Dinner.    atorvastatin 40 MG Oral Tab Take 1 tablet (40 mg total) by mouth daily.    insulin aspart 100 Units/mL Injection Solution Inject 20 Units into the skin 2 (two) times daily before meals.    Multiple Vitamin (ONE-A-DAY MENS OR) Take by mouth daily.       Review of Systems:     Constitutional: Negative.    HENT: Negative.     Eyes: Negative.    Respiratory: Negative.     Cardiovascular: Negative.    Gastrointestinal: Negative.    Endocrine: Negative.    Musculoskeletal: Negative.    Skin: Negative.    Allergic/Immunologic: Negative.    Neurological: Negative.    Hematological: Negative.    Psychiatric/Behavioral: Negative.         Physical Exam:   Vital Signs:  Blood pressure (!) 164/105, pulse 90, temperature 98.2 °F (36.8 °C), temperature source Oral, resp. rate 16, height 6' 3\" (1.905 m), weight 278 lb (126.1 kg), SpO2 98%.  Physical Exam      Results:     Lab Results   Component Value Date    WBC 8.6 02/23/2025    HGB 11.1 (L) 02/23/2025    HCT 33.9 (L) 02/23/2025    .0 02/23/2025    CREATSERUM 1.98 (H) 02/23/2025    BUN 17 02/23/2025     02/23/2025    K 3.9 02/23/2025     02/23/2025    CO2 24.0 02/23/2025     (H) 02/23/2025    CA 8.0 (L) 02/23/2025     US VENOUS DOPPLER LEG LEFT - DIAG IMG (CPT=93971)    Result Date: 2/22/2025  CONCLUSION:  1. No left lower extremity DVT. 2. Edema and a small complex fluid collection in the soft tissues adjacent to the amputation stump     Dictated by (CST): Jeremiah Liang MD on 2/22/2025 at 11:13 PM     Finalized by  (CST): Jeremiah Liang MD on 2/22/2025 at 11:16 PM               Assessment/Plan:   1 Left BKA stump infection on cefepime and vanco per ID  Ortho evaluation  2 HTN BP stable  3 DM monitor acccuheck  4 HLD on statin  D/W RN  MDM  Reviewed previous: labs  Total time providing critical care:  minutes. This excludes time spent performing separately reportable procedures and services.  Consults: primary care provider and orthopedics (ID)                   FELICITA KISER, MD PATRICK  2/23/2025         [1] No Known Allergies

## 2025-02-23 NOTE — PLAN OF CARE
Patient Alert and Oriented x4. Stand pivot w/o prostethic. Dilaudid for pain. L side stump open to air. Fall precautions in place. Call light and personal belongings within arms reach.   Problem: Patient Centered Care  Goal: Patient preferences are identified and integrated in the patient's plan of care  Description: Interventions:  - What would you like us to know as we care for you?   - Provide timely, complete, and accurate information to patient/family  - Incorporate patient and family knowledge, values, beliefs, and cultural backgrounds into the planning and delivery of care  - Encourage patient/family to participate in care and decision-making at the level they choose  - Honor patient and family perspectives and choices  2/23/2025 0521 by Stephen Orozco, RN  Outcome: Progressing     Problem: PAIN - ADULT  Goal: Verbalizes/displays adequate comfort level or patient's stated pain goal  Description: INTERVENTIONS:  - Encourage pt to monitor pain and request assistance  - Assess pain using appropriate pain scale  - Administer analgesics based on type and severity of pain and evaluate response  - Implement non-pharmacological measures as appropriate and evaluate response  - Consider cultural and social influences on pain and pain management  - Manage/alleviate anxiety  - Utilize distraction and/or relaxation techniques  - Monitor for opioid side effects  - Notify MD/LIP if interventions unsuccessful or patient reports new pain  - Anticipate increased pain with activity and pre-medicate as appropriate  Outcome: Progressing

## 2025-02-23 NOTE — ED QUICK NOTES
Orders for admission, patient is aware of plan and ready to go upstairs. Any questions, please call ED RN Brett at extension [phone].     Patient Covid vaccination status: Fully vaccinated     COVID Test Ordered in ED: None    COVID Suspicion at Admission: N/A    Running Infusions:  None    Mental Status/LOC at time of transport: AxOx4    Other pertinent information:   CIWA score: N/A   NIH score:  N/A

## 2025-02-23 NOTE — ED INITIAL ASSESSMENT (HPI)
Pt arrives through triage with family        complaints of increased left leg swelling. Leg was drain a couple months ago. +pain to leg  Left BKA.       Hx of Dm,

## 2025-02-23 NOTE — PROGRESS NOTES
[facility] Pharmacy Dosing Service      Initial Pharmacokinetic Consult for Vancomycin Dosing     Alejandro Mireles Jr. is a 49 year old male who is being initiated on vancomycin therapy for  L BKA infection .  Pharmacy has been asked to dose vancomycin by [place] .  The initial treatment and monitoring approach will be steady state AUC strategy.        Weight and Temperature:    Wt Readings from Last 1 Encounters:   25 126.1 kg (278 lb)        Temp Readings from Last 1 Encounters:   25 98.2 °F (36.8 °C) (Oral)      Labs:   Recent Labs   Lab 25  0630   CREATSERUM 2.01* 1.98*      Estimated Creatinine Clearance: 53.3 mL/min (A) (based on SCr of 1.98 mg/dL (H)).     Recent Labs   Lab 25  0630   WBC 9.9 8.6          The Pharmacokinetic Target is:     to 600 mg-h/L and trough <=15 mg/L    Renal Dosing Considerations:    None     Assessment/Plan:   Initial/Loading dose: Has received 1500 mg IV (15 mg/kg, capped at 2250 mg) x 1 initial dose.      Maintenance dose: Pharmacy will dose vancomycin at 1500 mg IV every 24 hours    Monitorin) Plan for vancomycin peak and trough to be obtained at steady state    2) Pharmacy will order SCr as clinically indicated to assess renal function.    3) Pharmacy will monitor for toxicity and efficacy, adjust vancomycin dose and/or frequency, and order vancomycin levels as appropriate per the Pharmacy and Therapeutics Committee approved protocol until discontinuation of the medication.       We appreciate the opportunity to assist in the care of this patient.     Sharonda Lance PharmD  2025  8:56 AM  [place]  Pharmacy Extension: [phone]

## 2025-02-24 ENCOUNTER — APPOINTMENT (OUTPATIENT)
Dept: INTERVENTIONAL RADIOLOGY/VASCULAR | Facility: HOSPITAL | Age: 50
End: 2025-02-24
Attending: CLINICAL NURSE SPECIALIST
Payer: MEDICARE

## 2025-02-24 LAB
ANION GAP SERPL CALC-SCNC: 9 MMOL/L (ref 0–18)
BUN BLD-MCNC: 20 MG/DL (ref 9–23)
BUN/CREAT SERPL: 10.6 (ref 10–20)
CALCIUM BLD-MCNC: 8.2 MG/DL (ref 8.7–10.4)
CHLORIDE SERPL-SCNC: 106 MMOL/L (ref 98–112)
CO2 SERPL-SCNC: 25 MMOL/L (ref 21–32)
CREAT BLD-MCNC: 1.88 MG/DL
DEPRECATED RDW RBC AUTO: 49.1 FL (ref 35.1–46.3)
EGFRCR SERPLBLD CKD-EPI 2021: 43 ML/MIN/1.73M2 (ref 60–?)
ERYTHROCYTE [DISTWIDTH] IN BLOOD BY AUTOMATED COUNT: 16.4 % (ref 11–15)
GLUCOSE BLD-MCNC: 162 MG/DL (ref 70–99)
GLUCOSE BLDC GLUCOMTR-MCNC: 114 MG/DL (ref 70–99)
GLUCOSE BLDC GLUCOMTR-MCNC: 134 MG/DL (ref 70–99)
GLUCOSE BLDC GLUCOMTR-MCNC: 139 MG/DL (ref 70–99)
GLUCOSE BLDC GLUCOMTR-MCNC: 167 MG/DL (ref 70–99)
GLUCOSE BLDC GLUCOMTR-MCNC: 222 MG/DL (ref 70–99)
GLUCOSE BLDC GLUCOMTR-MCNC: 97 MG/DL (ref 70–99)
HCT VFR BLD AUTO: 36.1 %
HGB BLD-MCNC: 11.4 G/DL
MCH RBC QN AUTO: 25.9 PG (ref 26–34)
MCHC RBC AUTO-ENTMCNC: 31.6 G/DL (ref 31–37)
MCV RBC AUTO: 81.9 FL
OSMOLALITY SERPL CALC.SUM OF ELEC: 296 MOSM/KG (ref 275–295)
PLATELET # BLD AUTO: 299 10(3)UL (ref 150–450)
POTASSIUM SERPL-SCNC: 4.4 MMOL/L (ref 3.5–5.1)
RBC # BLD AUTO: 4.41 X10(6)UL
SODIUM SERPL-SCNC: 140 MMOL/L (ref 136–145)
WBC # BLD AUTO: 7.2 X10(3) UL (ref 4–11)

## 2025-02-24 PROCEDURE — 82962 GLUCOSE BLOOD TEST: CPT

## 2025-02-24 PROCEDURE — 0Y9J3ZX DRAINAGE OF LEFT LOWER LEG, PERCUTANEOUS APPROACH, DIAGNOSTIC: ICD-10-PCS | Performed by: RADIOLOGY

## 2025-02-24 PROCEDURE — 87205 SMEAR GRAM STAIN: CPT | Performed by: CLINICAL NURSE SPECIALIST

## 2025-02-24 PROCEDURE — 85027 COMPLETE CBC AUTOMATED: CPT | Performed by: INTERNAL MEDICINE

## 2025-02-24 PROCEDURE — 87186 SC STD MICRODIL/AGAR DIL: CPT | Performed by: CLINICAL NURSE SPECIALIST

## 2025-02-24 PROCEDURE — 10160 PNXR ASPIR ABSC HMTMA BULLA: CPT | Performed by: RADIOLOGY

## 2025-02-24 PROCEDURE — 80048 BASIC METABOLIC PNL TOTAL CA: CPT | Performed by: INTERNAL MEDICINE

## 2025-02-24 PROCEDURE — 99211 OFF/OP EST MAY X REQ PHY/QHP: CPT

## 2025-02-24 PROCEDURE — 87070 CULTURE OTHR SPECIMN AEROBIC: CPT | Performed by: CLINICAL NURSE SPECIALIST

## 2025-02-24 PROCEDURE — 76942 ECHO GUIDE FOR BIOPSY: CPT | Performed by: RADIOLOGY

## 2025-02-24 PROCEDURE — 87075 CULTR BACTERIA EXCEPT BLOOD: CPT | Performed by: CLINICAL NURSE SPECIALIST

## 2025-02-24 PROCEDURE — 87184 SC STD DISK METHOD PER PLATE: CPT | Performed by: CLINICAL NURSE SPECIALIST

## 2025-02-24 PROCEDURE — 87147 CULTURE TYPE IMMUNOLOGIC: CPT | Performed by: CLINICAL NURSE SPECIALIST

## 2025-02-24 RX ORDER — LIDOCAINE HYDROCHLORIDE 20 MG/ML
INJECTION, SOLUTION INFILTRATION; PERINEURAL
Status: COMPLETED
Start: 2025-02-24 | End: 2025-02-24

## 2025-02-24 NOTE — PROCEDURES
[facility]  part of [facility]  Procedure Note    Alejandro Solomon Mireles Jr. Patient Status:  Inpatient    1975 MRN [id number]   Location [facility] INTERVENTIONAL SUITES Attending Mary Keller MD   Hosp Day # 1 PCP Fernanda Chapman MD     Procedure: sonographic guided left stump fluid collection aspiration    Pre-Procedure Diagnosis:  r/o infecton    Post-Procedure Diagnosis: same    Anesthesia:  Local    Findings:  small fluid collection in stump-- approximately 3 mls of bloody fluid removed      Blood Loss:  minimal       Complications:  None       Stanley Guerra MD  2025

## 2025-02-24 NOTE — PROGRESS NOTES
Patient is AxO x4, in room 412. Time out performed with Dr. Guerra and all staff present. 3 mL of lidocaine to the site for aspiration of drainage. 3 mL of sanguinous fluid aspirated. Site is clean, dry, intact. Site is without hematoma or bleeding. Specimens collected and sent. Report to MENA Cedillo.

## 2025-02-24 NOTE — PROGRESS NOTES
[facility]  part of [facility]    Progress Note    Alejandro Mireles Jr. Patient Status:  Inpatient    1975 MRN [id number]   Location [facility] 4W/SW/SE Attending Mary Keller MD   Hosp Day # 1 PCP Fernanda Chapman MD     Chief Complaint: Stump infection    Subjective:     Constitutional: Negative.    HENT: Negative.     Eyes: Negative.    Respiratory: Negative.     Cardiovascular: Negative.    Gastrointestinal: Negative.    Endocrine: Negative.    Genitourinary: Negative.    Musculoskeletal: Negative.    Skin: Negative.    Neurological: Negative.    Hematological: Negative.  Does not bruise/bleed easily.   Psychiatric/Behavioral: Negative.  Negative for agitation.        Objective:   Blood pressure (!) 148/96, pulse 82, temperature 98.4 °F (36.9 °C), temperature source Oral, resp. rate 18, height 6' 3\" (1.905 m), weight 278 lb (126.1 kg), SpO2 96%.  Physical Exam  Vitals and nursing note reviewed.   Constitutional:       Appearance: Normal appearance.   HENT:      Head: Normocephalic and atraumatic.   Eyes:      Pupils: Pupils are equal, round, and reactive to light.   Cardiovascular:      Rate and Rhythm: Normal rate and regular rhythm.   Pulmonary:      Effort: Pulmonary effort is normal.      Breath sounds: Normal breath sounds.   Abdominal:      General: Abdomen is flat.      Palpations: Abdomen is soft.   Musculoskeletal:      Cervical back: Neck supple.   Skin:     Findings: Lesion present.   Neurological:      General: No focal deficit present.      Mental Status: He is alert and oriented to person, place, and time.   Psychiatric:         Mood and Affect: Mood normal.         Results:   Lab Results   Component Value Date    WBC 7.2 2025    HGB 11.4 (L) 2025    HCT 36.1 (L) 2025    .0 2025    CREATSERUM 1.88 (H) 2025    BUN 20 2025     2025    K 4.4 2025     2025    CO2 25.0 2025    GLU  162 (H) 02/24/2025    CA 8.2 (L) 02/24/2025       US VENOUS DOPPLER LEG LEFT - DIAG IMG (CPT=93971)    Result Date: 2/22/2025  CONCLUSION:  1. No left lower extremity DVT. 2. Edema and a small complex fluid collection in the soft tissues adjacent to the amputation stump     Dictated by (CST): Jeremiah Liang MD on 2/22/2025 at 11:13 PM     Finalized by (CST): Jeremiah Liang MD on 2/22/2025 at 11:16 PM               Assessment & Plan:         1 Left BKA stump infection on cefepime and vanco   2 HTN BP stable  3 DM monitor acccuheck  4 HLD on statin  Time spent 35 mins        FELICITA KISER, MD PATRICK  2/24/2025

## 2025-02-24 NOTE — PLAN OF CARE
Patient refusing any assistance from staff despite being educated on the importance of maintaining safety precautions. Refusing assessment at this time and dressing change.

## 2025-02-24 NOTE — PROGRESS NOTES
02/24/25 1331   Wound 02/23/25 Knee Anterior;Left   Date First Assessed/Time First Assessed: 02/23/25 0133   Present on Original Admission: Yes  Primary Wound Type: Old surgical  Location: Knee  Wound Location Orientation: Anterior;Left  Wound Description (Comments): L BKA, L Stump s/p IR drainage 2/25   Wound Image     Site Assessment Fragile;Pink;Red   Drainage Amount Small   Drainage Description Sanguineous   Treatments Cleansed;Saline;Site Care   Dressing Aquacel Foam;Hydrofiber with silver   Dressing Changed Changed   Dressing Status Clean;Dry;Intact;Dressing Changed   Wound Length (cm) 0.2 cm   Wound Width (cm) 0.2 cm   Wound Surface Area (cm^2) 0.04 cm^2   Wound Depth (cm) 0.1 cm   Wound Volume (cm^3) 0.004 cm^3   Margins Attached edges   Non-staged Wound Description Full thickness   Liliana-wound Assessment Callous;Dry;Fragile;Pink   Wound Granulation Tissue Red;Pink   Wound Bed Granulation (%) 100 %   State of Healing Early/partial granulation   Wound Odor None   Tunneling? No   Undermining? No   Sinus Tracts? No   Wound Follow Up   Follow up needed No     Wound Care  Nursing consult to assess the pt's left stump wound, the pt. is agreeable for assessment. He is s/p IR stump drainage, old band aid saturated with bloody exudate, left stump cleansed with saline and gauze, Applied a piece of silver alginate and a border foam, secured in place. Per the pt. he is has some of these dressings at home. The pt. was following up in the [facility] wound clinic. All questions answered. Spoke with the nurse, the pt. hopes to discharge soon.

## 2025-02-24 NOTE — IMAGING NOTE
Ultrasound shows left stump fluid collection.  Discussed plan for aspiration, patient agrees to proceed.

## 2025-02-24 NOTE — PROGRESS NOTES
INFECTIOUS DISEASE PROGRESS NOTE  [facility]  part of [facility] ID PROGRESS NOTE    Alejandro Mireles Jr. Patient Status:  Inpatient    1975 MRN [id number]   Location [facility] 4W/SW/SE Attending Mary Keller MD   Hosp Day # 1 PCP Fernanda Chapman MD     Subjective:  ROS reviewed. Stating that he does not need surgery and states that a bone infection was \"never proven\".     ASSESSMENT:    Antibiotics: Vancomycin, Cefepime     # L BKA stump abscess               -  MRI  with distal tibia OM involving osteotomy margin + abscess (1.9 x 1.8cm) w/ sinus tract to skin, s/p bedside aspiration, cx ngtd; drainage cx- S.agalactiae, diptheroids               -  Refused IV abx given hx PICC assc DVT, s/p augmentin through                - Seen again on doppler US  (4.8 x 5.3 x 4.5cm)   -Plans for IR aspiration      # Hx R TMA abscess 2024- s/p I&D, ertapenem, dc 24  # Hx BLE OM (s/p L BKA , R TMA )     # DM, CKD  # HTN      PLAN:  -  Continue on vancomycin + cefepime. FU cx.  -  US noted. Needs vascular evaluation but states that he wound refuse surgery. Will have IR evaluate for aspiration.  -  Local wound care. Wound care to evaluate.  -  Follow fever curve, wbc  -  Reviewed labs, micro, imaging reports, available old records  -  Case d/w patient, RN.     History of Present Illness:  49 year old male with PMH HTN, DM w/neuropathy/nephropathy, PAD, LLE OM (s/p L BKA ), R forefoot OM (s/p R TMA ), c/b R TMA OM/abscess (s/p I&D 24, ertapenem (EOT 24)c/b PICC assc RUE DVT))     Pt admitted at [facility] in 2024 with L BKA stump OM and abscess s/p bedside aspiration, cx ngtd. Admit drainage cx w/S.agalactiae and diptheroids. Was on zosyn. Pt was unwilling to undergo another course of iv abx d/t hx DVT and thus DC on augmentin through 25. Now presents to [facility] ER  with L stump swelling. He believes that these recurrent  infections keep happening because of excessive sweating while wearing his prosthetic. There was also a scab on his stump that he recently picked off. Endorses stump associated pain and swelling but reported drainage. No fevers or chills. Pt does not want any invasive surgical procedures or iv antibiotics. On admit, no fever. WBC 9.9. LLE dopplers neg for DVT but with abscess (4.8 x 5.3 x 4.5 cm). Bcx sent. Pt given 1x dose vancomycin + zosyn. ID consulted.     Physical Exam:  BP (!) 148/96 (BP Location: Right arm)   Pulse 82   Temp 98.4 °F (36.9 °C) (Oral)   Resp 18   Ht 6' 3\" (1.905 m)   Wt 278 lb (126.1 kg)   SpO2 96%   BMI 34.75 kg/m²     Gen:   Awake, in bed  HEENT:  EOMI, neck supple  CV/lungs:  RRR, CTAB  Abdom:  Soft, no TTP  Skin/extrem:  L BKA stump with open area with scant drainage  Lines:  PIV+    Laboratory Data: Reviewed    Microbiology: Reviewed    Radiology: Reviewed      [facility]  [phone]  2/24/2025

## 2025-02-24 NOTE — PLAN OF CARE
Patient Alert and Oriented x4. Dilaudid for pain, uses urinal. Stand pivot. On IV abx. Fall precautions in place. Call light and personal belongings within arms reach.  Problem: Patient Centered Care  Goal: Patient preferences are identified and integrated in the patient's plan of care  Description: Interventions:  - What would you like us to know as we care for you?   - Provide timely, complete, and accurate information to patient/family  - Incorporate patient and family knowledge, values, beliefs, and cultural backgrounds into the planning and delivery of care  - Encourage patient/family to participate in care and decision-making at the level they choose  - Honor patient and family perspectives and choices  Outcome: Progressing     Problem: PAIN - ADULT  Goal: Verbalizes/displays adequate comfort level or patient's stated pain goal  Description: INTERVENTIONS:  - Encourage pt to monitor pain and request assistance  - Assess pain using appropriate pain scale  - Administer analgesics based on type and severity of pain and evaluate response  - Implement non-pharmacological measures as appropriate and evaluate response  - Consider cultural and social influences on pain and pain management  - Manage/alleviate anxiety  - Utilize distraction and/or relaxation techniques  - Monitor for opioid side effects  - Notify MD/LIP if interventions unsuccessful or patient reports new pain  - Anticipate increased pain with activity and pre-medicate as appropriate  Outcome: Progressing

## 2025-02-25 LAB
GLUCOSE BLDC GLUCOMTR-MCNC: 117 MG/DL (ref 70–99)
GLUCOSE BLDC GLUCOMTR-MCNC: 121 MG/DL (ref 70–99)
GLUCOSE BLDC GLUCOMTR-MCNC: 135 MG/DL (ref 70–99)
GLUCOSE BLDC GLUCOMTR-MCNC: 141 MG/DL (ref 70–99)
GLUCOSE BLDC GLUCOMTR-MCNC: 204 MG/DL (ref 70–99)

## 2025-02-25 PROCEDURE — 82962 GLUCOSE BLOOD TEST: CPT

## 2025-02-25 NOTE — PLAN OF CARE
Pt is A&O X4. Breathing on room air. Voiding via Urinal. Monitoring blood glucose per order. Given Dilaudid prn for pain. D.C. plan is return home when stable. Call light within reach. Safety precaution in place.    Problem: Patient Centered Care  Goal: Patient preferences are identified and integrated in the patient's plan of care  Description: Interventions:  - What would you like us to know as we care for you? I have one daughter and two granddaughters.  - Provide timely, complete, and accurate information to patient/family  - Incorporate patient and family knowledge, values, beliefs, and cultural backgrounds into the planning and delivery of care  - Encourage patient/family to participate in care and decision-making at the level they choose  - Honor patient and family perspectives and choices  Outcome: Progressing     Problem: Patient/Family Goals  Goal: Patient/Family Long Term Goal  Description: Patient's Long Term Goal:     Interventions:  -   - See additional Care Plan goals for specific interventions  Outcome: Progressing  Goal: Patient/Family Short Term Goal  Description: Patient's Short Term Goal: Pain level < 4/10    Interventions:   - Pain med prn  - See additional Care Plan goals for specific interventions  Outcome: Progressing     Problem: PAIN - ADULT  Goal: Verbalizes/displays adequate comfort level or patient's stated pain goal  Description: INTERVENTIONS:  - Encourage pt to monitor pain and request assistance  - Assess pain using appropriate pain scale  - Administer analgesics based on type and severity of pain and evaluate response  - Implement non-pharmacological measures as appropriate and evaluate response  - Consider cultural and social influences on pain and pain management  - Manage/alleviate anxiety  - Utilize distraction and/or relaxation techniques  - Monitor for opioid side effects  - Notify MD/LIP if interventions unsuccessful or patient reports new pain  - Anticipate increased pain  with activity and pre-medicate as appropriate  Outcome: Progressing     Problem: RISK FOR INFECTION - ADULT  Goal: Absence of fever/infection during anticipated neutropenic period  Description: INTERVENTIONS  - Monitor WBC  - Administer growth factors as ordered  - Implement neutropenic guidelines  Outcome: Progressing     Problem: SAFETY ADULT - FALL  Goal: Free from fall injury  Description: INTERVENTIONS:  - Assess pt frequently for physical needs  - Identify cognitive and physical deficits and behaviors that affect risk of falls.  - [place] fall precautions as indicated by assessment.  - Educate pt/family on patient safety including physical limitations  - Instruct pt to call for assistance with activity based on assessment  - Modify environment to reduce risk of injury  - Provide assistive devices as appropriate  - Consider OT/PT consult to assist with strengthening/mobility  - Encourage toileting schedule  Outcome: Progressing     Problem: DISCHARGE PLANNING  Goal: Discharge to home or other facility with appropriate resources  Description: INTERVENTIONS:  - Identify barriers to discharge w/pt and caregiver  - Include patient/family/discharge partner in discharge planning  - Arrange for needed discharge resources and transportation as appropriate  - Identify discharge learning needs (meds, wound care, etc)  - Arrange for interpreters to assist at discharge as needed  - Consider post-discharge preferences of patient/family/discharge partner  - Complete POLST form as appropriate  - Assess patient's ability to be responsible for managing their own health  - Refer to Case Management Department for coordinating discharge planning if the patient needs post-hospital services based on physician/LIP order or complex needs related to functional status, cognitive ability or social support system  Outcome: Progressing

## 2025-02-25 NOTE — PLAN OF CARE
Alejandro is up with walker to the bathroom. Dressing in place to left stump. IV dilaudid given as needed for pain. Had episode of emesis, zofran given. Accu Check ACHS.  IV antibiotics continued. Plan for home when cleared.     Problem: Patient Centered Care  Goal: Patient preferences are identified and integrated in the patient's plan of care  Description: Interventions:  - What would you like us to know as we care for you? \"I have two granddaughters.\"  - Provide timely, complete, and accurate information to patient/family  - Incorporate patient and family knowledge, values, beliefs, and cultural backgrounds into the planning and delivery of care  - Encourage patient/family to participate in care and decision-making at the level they choose  - Honor patient and family perspectives and choices  Outcome: Progressing      Problem: PAIN - ADULT  Goal: Verbalizes/displays adequate comfort level or patient's stated pain goal  Description: INTERVENTIONS:  - Encourage pt to monitor pain and request assistance  - Assess pain using appropriate pain scale  - Administer analgesics based on type and severity of pain and evaluate response  - Implement non-pharmacological measures as appropriate and evaluate response  - Consider cultural and social influences on pain and pain management  - Manage/alleviate anxiety  - Utilize distraction and/or relaxation techniques  - Monitor for opioid side effects  - Notify MD/LIP if interventions unsuccessful or patient reports new pain  - Anticipate increased pain with activity and pre-medicate as appropriate  Outcome: Progressing     Problem: RISK FOR INFECTION - ADULT  Goal: Absence of fever/infection during anticipated neutropenic period  Description: INTERVENTIONS  - Monitor WBC  - Administer growth factors as ordered  - Implement neutropenic guidelines  Outcome: Progressing     Problem: SAFETY ADULT - FALL  Goal: Free from fall injury  Description: INTERVENTIONS:  - Assess pt frequently for  physical needs  - Identify cognitive and physical deficits and behaviors that affect risk of falls.  - [place] fall precautions as indicated by assessment.  - Educate pt/family on patient safety including physical limitations  - Instruct pt to call for assistance with activity based on assessment  - Modify environment to reduce risk of injury  - Provide assistive devices as appropriate  - Consider OT/PT consult to assist with strengthening/mobility  - Encourage toileting schedule  Outcome: Progressing     Problem: DISCHARGE PLANNING  Goal: Discharge to home or other facility with appropriate resources  Description: INTERVENTIONS:  - Identify barriers to discharge w/pt and caregiver  - Include patient/family/discharge partner in discharge planning  - Arrange for needed discharge resources and transportation as appropriate  - Identify discharge learning needs (meds, wound care, etc)  - Arrange for interpreters to assist at discharge as needed  - Consider post-discharge preferences of patient/family/discharge partner  - Complete POLST form as appropriate  - Assess patient's ability to be responsible for managing their own health  - Refer to Case Management Department for coordinating discharge planning if the patient needs post-hospital services based on physician/LIP order or complex needs related to functional status, cognitive ability or social support system  Outcome: Progressing

## 2025-02-25 NOTE — PROGRESS NOTES
Physician Clarification    Additional information related to the patient's condition  CKD stage 3    This note is part of the patient's medical record.

## 2025-02-25 NOTE — PROGRESS NOTES
INFECTIOUS DISEASE PROGRESS NOTE  [facility]  part of [facility] ID PROGRESS NOTE    Alejandro Mireles Jr. Patient Status:  Inpatient    1975 MRN [id number]   Location [facility] 4W/SW/SE Attending Mary Keller MD   Hosp Day # 2 PCP Fernanda Chapman MD     Subjective:  ROS reviewed. Feels better. No complaints of pain.    ASSESSMENT:    Antibiotics: Vancomycin, Cefepime     # L BKA stump abscess with concerns for chronic OM with sinus tract               -  MRI  with distal tibia OM involving osteotomy margin + abscess (1.9 x 1.8cm) w/ sinus tract to skin, s/p bedside aspiration, cx ngtd; drainage cx- S.agalactiae, diptheroids               -  Refused IV abx given hx PICC assc DVT, s/p augmentin through                - Seen again on doppler US  (4.8 x 5.3 x 4.5cm)   -S/p IR aspiration , cx pending  # Hx R TMA abscess 2024- s/p I&D, ertapenem, dc 24  # Hx BLE OM (s/p L BKA , R TMA )  # DM, CKD  # HTN      PLAN:  -  Continue on vancomycin + cefepime. FU cx.  -  OSH MRI from 2024 shows osteomyelitis. Will need vascular surgery follow-up.  -  Local wound care.   -  Follow fever curve, wbc.  -  Reviewed labs, micro, imaging reports, available old records.  -  Case d/w patient, RN.     History of Present Illness:  49 year old male with PMH HTN, DM w/neuropathy/nephropathy, PAD, LLE OM (s/p L BKA ), R forefoot OM (s/p R TMA ), c/b R TMA OM/abscess (s/p I&D 24, ertapenem (EOT 24)c/b PICC assc RUE DVT))     Pt admitted at [facility] in 2024 with L BKA stump OM and abscess s/p bedside aspiration, cx ngtd. Admit drainage cx w/S.agalactiae and diptheroids. Was on zosyn. Pt was unwilling to undergo another course of iv abx d/t hx DVT and thus DC on augmentin through 25. Now presents to [facility] ER  with L stump swelling. He believes that these recurrent infections keep happening because of excessive sweating while  wearing his prosthetic. There was also a scab on his stump that he recently picked off. Endorses stump associated pain and swelling but reported drainage. No fevers or chills. Pt does not want any invasive surgical procedures or iv antibiotics. On admit, no fever. WBC 9.9. LLE dopplers neg for DVT but with abscess (4.8 x 5.3 x 4.5 cm). Bcx sent. Pt given 1x dose vancomycin + zosyn. ID consulted.     Physical Exam:  BP (!) 155/99 (BP Location: Left arm)   Pulse 81   Temp 98.2 °F (36.8 °C) (Oral)   Resp 17   Ht 6' 3\" (1.905 m)   Wt 278 lb (126.1 kg)   SpO2 97%   BMI 34.75 kg/m²     Gen:   Awake, in bed  HEENT:  EOMI, neck supple  CV/lungs:  RRR, CTAB  Abdom:  Soft, no TTP  Skin/extrem:  L BKA stump with open area with scant drainage  Lines:  PIV+    Laboratory Data: Reviewed    Microbiology: Reviewed    Radiology: Reviewed      SUSAN [facility]  [phone]  2/25/2025

## 2025-02-25 NOTE — PLAN OF CARE
Patient AOX4. Room air. General diet, tolerated well. ACHS. Insulin given per pt needs, MD aware. Voiding freely/ urinal.  PRN dilaudid given for pain management. Saline locked.

## 2025-02-25 NOTE — PROGRESS NOTES
[facility]  part of [facility]    Progress Note    Alejandro Mireles Jr. Patient Status:  Inpatient    1975 MRN [id number]   Location [facility] 4W/SW/SE Attending Mary Keller MD   Hosp Day # 2 PCP Fernanda Chapman MD     Chief Complaint: Stump infection    Subjective:     Constitutional: Negative.    HENT: Negative.     Eyes: Negative.    Respiratory: Negative.     Cardiovascular: Negative.    Gastrointestinal: Negative.    Endocrine: Negative.    Genitourinary: Negative.    Musculoskeletal: Negative.    Skin: Negative.    Neurological: Negative.    Hematological: Negative.  Does not bruise/bleed easily.   Psychiatric/Behavioral: Negative.  Negative for agitation.        Objective:   Blood pressure (!) 155/99, pulse 81, temperature 98.2 °F (36.8 °C), temperature source Oral, resp. rate 17, height 6' 3\" (1.905 m), weight 278 lb (126.1 kg), SpO2 97%.  Physical Exam  Vitals and nursing note reviewed.   Constitutional:       Appearance: Normal appearance.   HENT:      Head: Normocephalic and atraumatic.   Eyes:      Pupils: Pupils are equal, round, and reactive to light.   Cardiovascular:      Rate and Rhythm: Normal rate and regular rhythm.   Pulmonary:      Effort: Pulmonary effort is normal.      Breath sounds: Normal breath sounds.   Abdominal:      General: Abdomen is flat.      Palpations: Abdomen is soft.   Musculoskeletal:      Cervical back: Neck supple.   Skin:     Findings: Lesion present.   Neurological:      General: No focal deficit present.      Mental Status: He is alert and oriented to person, place, and time.   Psychiatric:         Mood and Affect: Mood normal.         Results:   Lab Results   Component Value Date    WBC 7.2 2025    HGB 11.4 (L) 2025    HCT 36.1 (L) 2025    .0 2025    CREATSERUM 1.88 (H) 2025    BUN 20 2025     2025    K 4.4 2025     2025    CO2 25.0 2025    GLU  162 (H) 02/24/2025    CA 8.2 (L) 02/24/2025       No results found.        Assessment & Plan:         1 Left BKA stump infection on cefepime and vanco   Follow cultures   2 HTN BP stable  3 DM monitor acccuheck  4 HLD on statin  Time spent 35 mins  DC home tomorrow

## 2025-02-26 LAB
ANION GAP SERPL CALC-SCNC: 8 MMOL/L (ref 0–18)
BUN BLD-MCNC: 19 MG/DL (ref 9–23)
BUN/CREAT SERPL: 10.4 (ref 10–20)
CALCIUM BLD-MCNC: 8.7 MG/DL (ref 8.7–10.4)
CHLORIDE SERPL-SCNC: 105 MMOL/L (ref 98–112)
CO2 SERPL-SCNC: 27 MMOL/L (ref 21–32)
CREAT BLD-MCNC: 1.82 MG/DL
EGFRCR SERPLBLD CKD-EPI 2021: 45 ML/MIN/1.73M2 (ref 60–?)
GLUCOSE BLD-MCNC: 157 MG/DL (ref 70–99)
GLUCOSE BLDC GLUCOMTR-MCNC: 123 MG/DL (ref 70–99)
GLUCOSE BLDC GLUCOMTR-MCNC: 139 MG/DL (ref 70–99)
GLUCOSE BLDC GLUCOMTR-MCNC: 179 MG/DL (ref 70–99)
GLUCOSE BLDC GLUCOMTR-MCNC: 81 MG/DL (ref 70–99)
OSMOLALITY SERPL CALC.SUM OF ELEC: 296 MOSM/KG (ref 275–295)
POTASSIUM SERPL-SCNC: 4.5 MMOL/L (ref 3.5–5.1)
SODIUM SERPL-SCNC: 140 MMOL/L (ref 136–145)

## 2025-02-26 PROCEDURE — 82962 GLUCOSE BLOOD TEST: CPT

## 2025-02-26 PROCEDURE — 80048 BASIC METABOLIC PNL TOTAL CA: CPT | Performed by: INTERNAL MEDICINE

## 2025-02-26 NOTE — PROGRESS NOTES
[facility]  part of [facility]    Progress Note    Alejandro Mireles Jr. Patient Status:  Inpatient    1975 MRN [id number]   Location [facility] 4W/SW/SE Attending Mary Keller MD   Hosp Day # 3 PCP Fernanda Chapman MD     Chief Complaint: Stump infection    Subjective:     Constitutional: Negative.    HENT: Negative.     Eyes: Negative.    Respiratory: Negative.     Cardiovascular: Negative.    Gastrointestinal: Negative.    Endocrine: Negative.    Genitourinary: Negative.    Musculoskeletal: Negative.    Skin: Negative.    Neurological: Negative.    Hematological: Negative.  Does not bruise/bleed easily.   Psychiatric/Behavioral: Negative.  Negative for agitation.        Objective:   Blood pressure (!) 152/92, pulse 86, temperature 98.2 °F (36.8 °C), temperature source Oral, resp. rate 18, height 6' 3\" (1.905 m), weight 278 lb (126.1 kg), SpO2 95%.  Physical Exam  Vitals and nursing note reviewed.   Constitutional:       Appearance: Normal appearance.   HENT:      Head: Normocephalic and atraumatic.   Eyes:      Pupils: Pupils are equal, round, and reactive to light.   Cardiovascular:      Rate and Rhythm: Normal rate and regular rhythm.   Pulmonary:      Effort: Pulmonary effort is normal.      Breath sounds: Normal breath sounds.   Abdominal:      General: Abdomen is flat.      Palpations: Abdomen is soft.   Musculoskeletal:      Cervical back: Neck supple.   Skin:     Findings: Lesion present.   Neurological:      General: No focal deficit present.      Mental Status: He is alert and oriented to person, place, and time.   Psychiatric:         Mood and Affect: Mood normal.         Results:   Lab Results   Component Value Date    WBC 7.2 2025    HGB 11.4 (L) 2025    HCT 36.1 (L) 2025    .0 2025    CREATSERUM 1.82 (H) 2025    BUN 19 2025     2025    K 4.5 2025     2025    CO2 27.0 2025    GLU  157 (H) 02/26/2025    CA 8.7 02/26/2025       No results found.        Assessment & Plan:         1 Left BKA stump infection on cefepime and vanco   Follow cultures   2 HTN BP stable  3 DM monitor acccuheck  4 HLD on statin  Time spent 35 mins  D/W RN  Pt had earlier refused to see vascular

## 2025-02-26 NOTE — PROGRESS NOTES
INFECTIOUS DISEASE PROGRESS NOTE  [facility]  part of [facility] ID PROGRESS NOTE    Alejandro Mireles Jr. Patient Status:  Inpatient    1975 MRN [id number]   Location [facility] 4W/SW/SE Attending Mary Keller MD   Hosp Day # 3 PCP Fernanda Chapman MD     Subjective:  ROS reviewed. Feels better. Now agreeable to vascular surgery evaluation.    ASSESSMENT:    Antibiotics: Ancef  Vancomycin, Cefepime     # L BKA stump abscess with concerns for chronic OM with sinus tract               -  MRI  with distal tibia OM involving osteotomy margin + abscess (1.9 x 1.8cm) w/ sinus tract to skin, s/p bedside aspiration, cx ngtd; drainage cx- S.agalactiae, diptheroids               -  Refused IV abx given hx PICC assc DVT, s/p augmentin through                - Seen again on doppler US  (4.8 x 5.3 x 4.5cm)   -S/p IR aspiration , cx GBS  # Hx R TMA abscess 2024- s/p I&D, ertapenem, dc 24  # Hx BLE OM (s/p L BKA , R TMA )  # DM, CKD  # HTN      PLAN:  -  Stop vancomycin + cefepime and start ancef. Patient now agreeable to vascular surgery evaluation for consideration of revision.  -  Local wound care.   -  Follow fever curve, wbc.  -  Reviewed labs, micro, imaging reports, available old records.  -  Case d/w patient, RN.     History of Present Illness:  49 year old male with PMH HTN, DM w/neuropathy/nephropathy, PAD, LLE OM (s/p L BKA ), R forefoot OM (s/p R TMA ), c/b R TMA OM/abscess (s/p I&D 24, ertapenem (EOT 24)c/b PICC assc RUE DVT))     Pt admitted at [facility] in 2024 with L BKA stump OM and abscess s/p bedside aspiration, cx ngtd. Admit drainage cx w/S.agalactiae and diptheroids. Was on zosyn. Pt was unwilling to undergo another course of iv abx d/t hx DVT and thus DC on augmentin through 25. Now presents to [facility] ER  with L stump swelling. He believes that these recurrent infections keep happening because of  excessive sweating while wearing his prosthetic. There was also a scab on his stump that he recently picked off. Endorses stump associated pain and swelling but reported drainage. No fevers or chills. Pt does not want any invasive surgical procedures or iv antibiotics. On admit, no fever. WBC 9.9. LLE dopplers neg for DVT but with abscess (4.8 x 5.3 x 4.5 cm). Bcx sent. Pt given 1x dose vancomycin + zosyn. ID consulted.     Physical Exam:  BP (!) 152/92 (BP Location: Left arm)   Pulse 86   Temp 98.2 °F (36.8 °C) (Oral)   Resp 18   Ht 6' 3\" (1.905 m)   Wt 278 lb (126.1 kg)   SpO2 95%   BMI 34.75 kg/m²     Gen:   Awake, in bed  HEENT:  EOMI, neck supple  CV/lungs:  Regular rate and rhythm, CTAB  Abdom:  Soft, no TTP  Skin/extrem:  L BKA stump with open area with scant drainage  Lines:  PIV+    Laboratory Data: Reviewed    Microbiology: Reviewed    Radiology: Reviewed      SUS[facility]  [phone]  2/26/2025

## 2025-02-26 NOTE — PLAN OF CARE
Pt is A&O x4. Breathing on room air. Monitoring blood glucose per order. Voiding freely. Given Dilaudid prn for pain. Call light within reach. Safety precaution in place.    Problem: Patient Centered Care  Goal: Patient preferences are identified and integrated in the patient's plan of care  Description: Interventions:  - What would you like us to know as we care for you?   - Provide timely, complete, and accurate information to patient/family  - Incorporate patient and family knowledge, values, beliefs, and cultural backgrounds into the planning and delivery of care  - Encourage patient/family to participate in care and decision-making at the level they choose  - Honor patient and family perspectives and choices  Outcome: Progressing     Problem: Patient/Family Goals  Goal: Patient/Family Long Term Goal  Description: Patient's Long Term Goal:     Interventions:  - See additional Care Plan goals for specific interventions  Outcome: Progressing  Goal: Patient/Family Short Term Goal  Description: Patient's Short Term Goal: pain level < 2/10    Interventions:   - pain med prn  - See additional Care Plan goals for specific interventions  Outcome: Progressing     Problem: PAIN - ADULT  Goal: Verbalizes/displays adequate comfort level or patient's stated pain goal  Description: INTERVENTIONS:  - Encourage pt to monitor pain and request assistance  - Assess pain using appropriate pain scale  - Administer analgesics based on type and severity of pain and evaluate response  - Implement non-pharmacological measures as appropriate and evaluate response  - Consider cultural and social influences on pain and pain management  - Manage/alleviate anxiety  - Utilize distraction and/or relaxation techniques  - Monitor for opioid side effects  - Notify MD/LIP if interventions unsuccessful or patient reports new pain  - Anticipate increased pain with activity and pre-medicate as appropriate  Outcome: Progressing     Problem: RISK FOR  INFECTION - ADULT  Goal: Absence of fever/infection during anticipated neutropenic period  Description: INTERVENTIONS  - Monitor WBC  - Administer growth factors as ordered  - Implement neutropenic guidelines  Outcome: Progressing     Problem: SAFETY ADULT - FALL  Goal: Free from fall injury  Description: INTERVENTIONS:  - Assess pt frequently for physical needs  - Identify cognitive and physical deficits and behaviors that affect risk of falls.  - [place] fall precautions as indicated by assessment.  - Educate pt/family on patient safety including physical limitations  - Instruct pt to call for assistance with activity based on assessment  - Modify environment to reduce risk of injury  - Provide assistive devices as appropriate  - Consider OT/PT consult to assist with strengthening/mobility  - Encourage toileting schedule  Outcome: Progressing     Problem: DISCHARGE PLANNING  Goal: Discharge to home or other facility with appropriate resources  Description: INTERVENTIONS:  - Identify barriers to discharge w/pt and caregiver  - Include patient/family/discharge partner in discharge planning  - Arrange for needed discharge resources and transportation as appropriate  - Identify discharge learning needs (meds, wound care, etc)  - Arrange for interpreters to assist at discharge as needed  - Consider post-discharge preferences of patient/family/discharge partner  - Complete POLST form as appropriate  - Assess patient's ability to be responsible for managing their own health  - Refer to Case Management Department for coordinating discharge planning if the patient needs post-hospital services based on physician/LIP order or complex needs related to functional status, cognitive ability or social support system  Outcome: Progressing

## 2025-02-27 LAB
ANION GAP SERPL CALC-SCNC: 7 MMOL/L (ref 0–18)
BUN BLD-MCNC: 24 MG/DL (ref 9–23)
BUN/CREAT SERPL: 13.2 (ref 10–20)
CALCIUM BLD-MCNC: 8.6 MG/DL (ref 8.7–10.4)
CHLORIDE SERPL-SCNC: 104 MMOL/L (ref 98–112)
CO2 SERPL-SCNC: 29 MMOL/L (ref 21–32)
CREAT BLD-MCNC: 1.82 MG/DL
DEPRECATED RDW RBC AUTO: 46.7 FL (ref 35.1–46.3)
EGFRCR SERPLBLD CKD-EPI 2021: 45 ML/MIN/1.73M2 (ref 60–?)
ERYTHROCYTE [DISTWIDTH] IN BLOOD BY AUTOMATED COUNT: 16 % (ref 11–15)
GLUCOSE BLD-MCNC: 131 MG/DL (ref 70–99)
GLUCOSE BLDC GLUCOMTR-MCNC: 144 MG/DL (ref 70–99)
GLUCOSE BLDC GLUCOMTR-MCNC: 144 MG/DL (ref 70–99)
GLUCOSE BLDC GLUCOMTR-MCNC: 177 MG/DL (ref 70–99)
GLUCOSE BLDC GLUCOMTR-MCNC: 218 MG/DL (ref 70–99)
GLUCOSE BLDC GLUCOMTR-MCNC: 80 MG/DL (ref 70–99)
HCT VFR BLD AUTO: 36.2 %
HGB BLD-MCNC: 11.8 G/DL
MCH RBC QN AUTO: 26.2 PG (ref 26–34)
MCHC RBC AUTO-ENTMCNC: 32.6 G/DL (ref 31–37)
MCV RBC AUTO: 80.4 FL
OSMOLALITY SERPL CALC.SUM OF ELEC: 296 MOSM/KG (ref 275–295)
PLATELET # BLD AUTO: 344 10(3)UL (ref 150–450)
POTASSIUM SERPL-SCNC: 4.7 MMOL/L (ref 3.5–5.1)
RBC # BLD AUTO: 4.5 X10(6)UL
SODIUM SERPL-SCNC: 140 MMOL/L (ref 136–145)
WBC # BLD AUTO: 7.1 X10(3) UL (ref 4–11)

## 2025-02-27 PROCEDURE — 85027 COMPLETE CBC AUTOMATED: CPT | Performed by: INTERNAL MEDICINE

## 2025-02-27 PROCEDURE — 82962 GLUCOSE BLOOD TEST: CPT

## 2025-02-27 PROCEDURE — 80048 BASIC METABOLIC PNL TOTAL CA: CPT | Performed by: INTERNAL MEDICINE

## 2025-02-27 NOTE — PLAN OF CARE
Patient is A&Ox4. RA. General diet. Saline locked. Voiding via urinal. ACHS. PRN Dilaudid for pain management. Dressing changed overnight. Ancef continued. Call light within reach, frequent rounding. Safety measures in place. Plan for I&D, possible Friday.     Problem: Patient Centered Care  Goal: Patient preferences are identified and integrated in the patient's plan of care  Description: Interventions:  - What would you like us to know as we care for you?   - Provide timely, complete, and accurate information to patient/family  - Incorporate patient and family knowledge, values, beliefs, and cultural backgrounds into the planning and delivery of care  - Encourage patient/family to participate in care and decision-making at the level they choose  - Honor patient and family perspectives and choices  Outcome: Progressing     Problem: Patient/Family Goals  Goal: Patient/Family Long Term Goal  Description: Patient's Long Term Goal:     Interventions:  -   - See additional Care Plan goals for specific interventions  Outcome: Progressing  Goal: Patient/Family Short Term Goal  Description: Patient's Short Term Goal:     Interventions:     - See additional Care Plan goals for specific interventions  Outcome: Progressing     Problem: PAIN - ADULT  Goal: Verbalizes/displays adequate comfort level or patient's stated pain goal  Description: INTERVENTIONS:  - Encourage pt to monitor pain and request assistance  - Assess pain using appropriate pain scale  - Administer analgesics based on type and severity of pain and evaluate response  - Implement non-pharmacological measures as appropriate and evaluate response  - Consider cultural and social influences on pain and pain management  - Manage/alleviate anxiety  - Utilize distraction and/or relaxation techniques  - Monitor for opioid side effects  - Notify MD/LIP if interventions unsuccessful or patient reports new pain  - Anticipate increased pain with activity and pre-medicate  as appropriate  Outcome: Progressing     Problem: RISK FOR INFECTION - ADULT  Goal: Absence of fever/infection during anticipated neutropenic period  Description: INTERVENTIONS  - Monitor WBC  - Administer growth factors as ordered  - Implement neutropenic guidelines  Outcome: Progressing     Problem: SAFETY ADULT - FALL  Goal: Free from fall injury  Description: INTERVENTIONS:  - Assess pt frequently for physical needs  - Identify cognitive and physical deficits and behaviors that affect risk of falls.  - [place] fall precautions as indicated by assessment.  - Educate pt/family on patient safety including physical limitations  - Instruct pt to call for assistance with activity based on assessment  - Modify environment to reduce risk of injury  - Provide assistive devices as appropriate  - Consider OT/PT consult to assist with strengthening/mobility  - Encourage toileting schedule  Outcome: Progressing     Problem: DISCHARGE PLANNING  Goal: Discharge to home or other facility with appropriate resources  Description: INTERVENTIONS:  - Identify barriers to discharge w/pt and caregiver  - Include patient/family/discharge partner in discharge planning  - Arrange for needed discharge resources and transportation as appropriate  - Identify discharge learning needs (meds, wound care, etc)  - Arrange for interpreters to assist at discharge as needed  - Consider post-discharge preferences of patient/family/discharge partner  - Complete POLST form as appropriate  - Assess patient's ability to be responsible for managing their own health  - Refer to Case Management Department for coordinating discharge planning if the patient needs post-hospital services based on physician/LIP order or complex needs related to functional status, cognitive ability or social support system  Outcome: Progressing

## 2025-02-27 NOTE — PROGRESS NOTES
[facility]  part of [facility]    Progress Note    Alejandro Mireles Jr. Patient Status:  Inpatient    1975 MRN [id number]   Location [facility] 4W/SW/SE Attending Mary Keller MD   Hosp Day # 4 PCP Fernanda Chapman MD     Chief Complaint: Stump infection    Subjective:     Constitutional: Negative.    HENT: Negative.     Eyes: Negative.    Respiratory: Negative.     Cardiovascular: Negative.    Gastrointestinal: Negative.    Endocrine: Negative.    Genitourinary: Negative.    Musculoskeletal: Negative.    Skin: Negative.    Neurological: Negative.    Hematological: Negative.  Does not bruise/bleed easily.   Psychiatric/Behavioral: Negative.  Negative for agitation.        Objective:   Blood pressure (!) 156/98, pulse 84, temperature 97.3 °F (36.3 °C), temperature source Temporal, resp. rate 17, height 6' 3\" (1.905 m), weight 278 lb (126.1 kg), SpO2 98%.  Physical Exam  Vitals and nursing note reviewed.   Constitutional:       Appearance: Normal appearance.   HENT:      Head: Normocephalic and atraumatic.   Eyes:      Pupils: Pupils are equal, round, and reactive to light.   Cardiovascular:      Rate and Rhythm: Normal rate and regular rhythm.   Pulmonary:      Effort: Pulmonary effort is normal.      Breath sounds: Normal breath sounds.   Abdominal:      General: Abdomen is flat.      Palpations: Abdomen is soft.   Musculoskeletal:      Cervical back: Neck supple.   Skin:     Findings: Lesion present.   Neurological:      General: No focal deficit present.      Mental Status: He is alert and oriented to person, place, and time.   Psychiatric:         Mood and Affect: Mood normal.         Results:   Lab Results   Component Value Date    WBC 7.1 2025    HGB 11.8 (L) 2025    HCT 36.2 (L) 2025    .0 2025    CREATSERUM 1.82 (H) 2025    BUN 24 (H) 2025     2025    K 4.7 2025     2025    CO2 29.0  02/27/2025     (H) 02/27/2025    CA 8.6 (L) 02/27/2025       No results found.        Assessment & Plan:         1 Left BKA stump infection on Ancef  Pt seen by vascular for intervention tomorrow  Follow cultures   2 HTN BP stable  3 DM monitor acccuheck  4 HLD on statin  Time spent 35 mins

## 2025-02-27 NOTE — PLAN OF CARE
Patient is a&o x4. On room air. ACHS, general diet. On IV abx. Patient seen by vascular surgery today, plan is to have left BKA debridement on Friday. Pain being managed with IV dilaudid. Fall precautions in place. Call light within reach.  Problem: PAIN - ADULT  Goal: Verbalizes/displays adequate comfort level or patient's stated pain goal  Description: INTERVENTIONS:  - Encourage pt to monitor pain and request assistance  - Assess pain using appropriate pain scale  - Administer analgesics based on type and severity of pain and evaluate response  - Implement non-pharmacological measures as appropriate and evaluate response  - Consider cultural and social influences on pain and pain management  - Manage/alleviate anxiety  - Utilize distraction and/or relaxation techniques  - Monitor for opioid side effects  - Notify MD/LIP if interventions unsuccessful or patient reports new pain  - Anticipate increased pain with activity and pre-medicate as appropriate  Outcome: Progressing     Problem: RISK FOR INFECTION - ADULT  Goal: Absence of fever/infection during anticipated neutropenic period  Description: INTERVENTIONS  - Monitor WBC  - Administer growth factors as ordered  - Implement neutropenic guidelines  Outcome: Progressing     Problem: SAFETY ADULT - FALL  Goal: Free from fall injury  Description: INTERVENTIONS:  - Assess pt frequently for physical needs  - Identify cognitive and physical deficits and behaviors that affect risk of falls.  - [place] fall precautions as indicated by assessment.  - Educate pt/family on patient safety including physical limitations  - Instruct pt to call for assistance with activity based on assessment  - Modify environment to reduce risk of injury  - Provide assistive devices as appropriate  - Consider OT/PT consult to assist with strengthening/mobility  - Encourage toileting schedule  Outcome: Progressing     Problem: DISCHARGE PLANNING  Goal: Discharge to home or other facility  with appropriate resources  Description: INTERVENTIONS:  - Identify barriers to discharge w/pt and caregiver  - Include patient/family/discharge partner in discharge planning  - Arrange for needed discharge resources and transportation as appropriate  - Identify discharge learning needs (meds, wound care, etc)  - Arrange for interpreters to assist at discharge as needed  - Consider post-discharge preferences of patient/family/discharge partner  - Complete POLST form as appropriate  - Assess patient's ability to be responsible for managing their own health  - Refer to Case Management Department for coordinating discharge planning if the patient needs post-hospital services based on physician/LIP order or complex needs related to functional status, cognitive ability or social support system  Outcome: Progressing

## 2025-02-28 ENCOUNTER — ANESTHESIA EVENT (OUTPATIENT)
Dept: SURGERY | Facility: HOSPITAL | Age: 50
End: 2025-02-28
Payer: MEDICARE

## 2025-02-28 LAB
ANION GAP SERPL CALC-SCNC: 7 MMOL/L (ref 0–18)
BUN BLD-MCNC: 23 MG/DL (ref 9–23)
BUN/CREAT SERPL: 12.1 (ref 10–20)
CALCIUM BLD-MCNC: 8.5 MG/DL (ref 8.7–10.4)
CHLORIDE SERPL-SCNC: 104 MMOL/L (ref 98–112)
CO2 SERPL-SCNC: 28 MMOL/L (ref 21–32)
CREAT BLD-MCNC: 1.9 MG/DL
DEPRECATED RDW RBC AUTO: 47.5 FL (ref 35.1–46.3)
EGFRCR SERPLBLD CKD-EPI 2021: 42 ML/MIN/1.73M2 (ref 60–?)
ERYTHROCYTE [DISTWIDTH] IN BLOOD BY AUTOMATED COUNT: 16.2 % (ref 11–15)
GLUCOSE BLD-MCNC: 142 MG/DL (ref 70–99)
GLUCOSE BLDC GLUCOMTR-MCNC: 133 MG/DL (ref 70–99)
GLUCOSE BLDC GLUCOMTR-MCNC: 170 MG/DL (ref 70–99)
GLUCOSE BLDC GLUCOMTR-MCNC: 302 MG/DL (ref 70–99)
GLUCOSE BLDC GLUCOMTR-MCNC: 92 MG/DL (ref 70–99)
HCT VFR BLD AUTO: 37.6 %
HGB BLD-MCNC: 12.5 G/DL
MCH RBC QN AUTO: 26.7 PG (ref 26–34)
MCHC RBC AUTO-ENTMCNC: 33.2 G/DL (ref 31–37)
MCV RBC AUTO: 80.3 FL
OSMOLALITY SERPL CALC.SUM OF ELEC: 294 MOSM/KG (ref 275–295)
PLATELET # BLD AUTO: 357 10(3)UL (ref 150–450)
POTASSIUM SERPL-SCNC: 4.5 MMOL/L (ref 3.5–5.1)
RBC # BLD AUTO: 4.68 X10(6)UL
SODIUM SERPL-SCNC: 139 MMOL/L (ref 136–145)
WBC # BLD AUTO: 7.8 X10(3) UL (ref 4–11)

## 2025-02-28 PROCEDURE — 80048 BASIC METABOLIC PNL TOTAL CA: CPT | Performed by: INTERNAL MEDICINE

## 2025-02-28 PROCEDURE — 85027 COMPLETE CBC AUTOMATED: CPT | Performed by: INTERNAL MEDICINE

## 2025-02-28 PROCEDURE — 82962 GLUCOSE BLOOD TEST: CPT

## 2025-02-28 NOTE — CONSULTS
Samer F. Najjar, MD.    Vascular Surgery           VASCULAR SURGERY   INPATIENT CONSULT NOTE      Name: Alejandro Mireles Jr.   :   1975  [id number]     Date of Consultation: 2025       REFERRING PHYSICIAN: Mary Keller MD  PRIMARY CARE PHYSICIAN:  Fernanda Chapman MD    HISTORY OF PRESENT ILLNESS:   Patient is a 49 year old male whom I have been asked to see regarding assessment of his left BKA stump osteo. The L BKA was performed elsewhere in . He also has a hx of right TMA . He has has recurrent left stump infections with prior drainage and IV abx via a PICC line. MRI  with distal tibia OM + abscess.  Currently on Vac and cefepime. Fluid collection aspiration 2 days ago grew Streptococcus agalactiae (Group B beta strep)       PAST MEDICAL HISTORY:    Past Medical History:    Deep vein thrombosis (HCC)    from PICC line; no anticoagulants    Diabetes (HCC)    Visual impairment    L eye; glasses       PAST SURGICAL HISTORY:   Past Surgical History:   Procedure Laterality Date    Below knee leg amputation Left     estimated       MEDICATIONS:     Current Facility-Administered Medications:     ceFAZolin (Ancef) 2g in 10mL IV syringe premix, 2 g, Intravenous, Q8H    amLODIPine (Norvasc) tab 5 mg, 5 mg, Oral, Daily    atorvastatin (Lipitor) tab 40 mg, 40 mg, Oral, Nightly    losartan (Cozaar) tab 50 mg, 50 mg, Oral, Daily    multivitamin (Tab-A-Michael/Beta Carotene) tab 1 tablet, 1 tablet, Oral, Daily    insulin aspart (NovoLOG) 100 Units/mL FlexPen 20 Units, 20 Units, Subcutaneous, BID with meals    HYDROmorphone (Dilaudid) 1 MG/ML injection 1 mg, 1 mg, Intravenous, Q4H PRN    ondansetron (Zofran) 4 MG/2ML injection 4 mg, 4 mg, Intravenous, Q6H PRN    metoclopramide (Reglan) 5 mg/mL injection 5 mg, 5 mg, Intravenous, TID PRN    insulin aspart (NovoLOG) 100 Units/mL FlexPen 1-5 Units, 1-5 Units, Subcutaneous, TID CC and HS    ALLERGIES:    He has No Known Allergies.    SOCIAL  HISTORY:    Patient  reports that he has never smoked. He has never used smokeless tobacco. He reports that he does not currently use alcohol. He reports that he does not use drugs.    FAMILY HISTORY:    Patient's family history is not on file.    ROS:    Comprehensive ROS reviewed and negative except for what's stated above.  Including negative for chest pain, shortness of breath, syncope.     EXAM:  /88 (BP Location: Left arm)   Pulse 99   Temp 98.3 °F (36.8 °C) (Oral)   Resp 18   Ht 6' 3\" (1.905 m)   Wt 278 lb (126.1 kg)   SpO2 98%   BMI 34.75 kg/m²   GENERAL: alert and orientated X 3, well developed, well nourished, in no apparent distress  NEURO/PSYCH: normal mood and affect  NECK: supple   CAROTID: No bruits  RESPIRATORY: no rales, rhonchi, or wheezes B  CARDIO: RRR without murmur, no murmur, no gallop   ABDOMEN: soft, non-tender with no palpable aneurysm or masses  EXTREMITIES: no tenderness  VASCULAR:        Femoral Popliteal DP PT Peroneal Edema   Right 2+       biphasic signal biphasic signal biphasic signal none   Left 2+      BKA                              Sinus tract in BKA stump  no sensory or motor deficits      Diagnostic Data:      LABS:  Recent Labs   Lab 02/22/25 2213 02/23/25  0630 02/24/25  0538 02/27/25  0456   WBC 9.9 8.6 7.2 7.1   HGB 12.4* 11.1* 11.4* 11.8*   MCV 80.8 80.7 81.9 80.4   .0 279.0 299.0 344.0       Recent Labs   Lab 02/22/25  2213 02/23/25  0630 02/24/25  0538 02/26/25  1021 02/27/25  0456    140 140 140 140   K 4.5 3.9 4.4 4.5 4.7    107 106 105 104   CO2 26.0 24.0 25.0 27.0 29.0   BUN 18 17 20 19 24*   CREATSERUM 2.01* 1.98* 1.88* 1.82* 1.82*   * 123* 162* 157* 131*   CA 8.4* 8.0* 8.2* 8.7 8.6*     No results for input(s): \"PTP\", \"INR\", \"PTT\" in the last 168 hours.  No results for input(s): \"ALT\", \"AST\", \"ALB\", \"AMYLASE\", \"LIPASE\", \"LDH\" in the last 168 hours.    Invalid input(s): \"ALPHOS\", \"TBIL\", \"DBIL\", \"TPROT\"  No results for  input(s): \"TROP\" in the last 168 hours.  No results found for: \"ANAS\", \"HORACIO\", \"ANASCRN\"  No results for input(s): \"PCACT\", \"PSACT\", \"AT3ACT\", \"HIPAB\", \"PATHI\", \"STALA\", \"DRVVTRATIO\", \"DRVVT\", \"STACLOT\", \"CARIGG\", \"T1FC9QAYML\", \"I4QN6IJGHK\", \"RA\", \"HAVIGM\", \"HBCIGM\", \"HCVAB\", \"HBSAG\", \"HBCAB\", \"HBVDNAINTERP\", \"ANAS\", \"C3\", \"C4\" in the last 168 hours.    Lab Results   Component Value Date    HGB 11.8 (L) 02/27/2025    HGB 11.4 (L) 02/24/2025    HGB 11.1 (L) 02/23/2025    HGB 12.4 (L) 02/22/2025    HGB 12.0 (L) 11/13/2023    CREATSERUM 1.82 (H) 02/27/2025    CREATSERUM 1.82 (H) 02/26/2025    CREATSERUM 1.88 (H) 02/24/2025    CREATSERUM 1.98 (H) 02/23/2025    CREATSERUM 2.01 (H) 02/22/2025    CREATSERUM 1.77 (H) 11/13/2023           Radiology: IR FINE NEEDLE ASPIRATION W/IMAGING    Result Date: 2/25/2025  PROCEDURE: IR FINE NEEDLE ASPIRATION W/IMAGING  INDICATIONS: fluid collection aspiration  COMPARISON:  Leg ultrasound 2/22/2025  (S): FIORELLA Guerra MD  ANESTHESIA/SEDATION: Level of anesthesia/sedation:  local only   ESTIMATED BLOOD LOSS: 0  COMPLICATIONS: None  FINDINGS:  Informed consent was obtained.  Procedure was performed bedside.  The skin overlying the left lower extremity stump was prepped and draped in the usual sterile fashion and the soft tissues were infiltrated with local anesthetic.  The heterogeneous region  corresponding to the abnormality on recent ultrasound was identified.  Next, under direct sonographic visualization, a 5 Telugu sheathed needle was placed within the region.  A total of 3 mL of serosanguineous fluid was returned sent for appropriate analysis.  The needle was removed.  There were no immediate complications.            CONCLUSION: Aspiration of fluid in the distal left lower extremity stump as detailed above.     Dictated by (CST): Stanley Guerra MD on 2/25/2025 at 11:26 AM     Finalized by (CST): Stanley Guerra MD on 2/25/2025 at 11:29 AM          US VENOUS DOPPLER LEG LEFT -  DIAG IMG (CPT=93971)    Result Date: 2/22/2025  PROCEDURE: US VENOUS DOPPLER LEG LEFT-DIAG IMG (CPT=93971)  COMPARISON: None.  INDICATIONS: LLE pain and swelling, LT BKA  TECHNIQUE: Color duplex Doppler venous ultrasound of the left lower extremity was performed in the usual manner.  FINDINGS: The femoral and popliteal veins appear normal.  Normal flow was demonstrated with color and pulsed Doppler.   THROMBI: None visible. COMPRESSIBILITY: Normal. OTHER: Complex fluid in the subcutaneous tissues along the area of swelling at the amputation stump measuring 4.8 x 5.3 x 4.5 cm.         CONCLUSION:  1. No left lower extremity DVT. 2. Edema and a small complex fluid collection in the soft tissues adjacent to the amputation stump     Dictated by (CST): Jeremiah Liang MD on 2/22/2025 at 11:13 PM     Finalized by (CST): Jeremiah Liang MD on 2/22/2025 at 11:16 PM                 ASSESSMENT AND PLAN:     The patient is a 49 year old male who has evidence of osteomyelitis involving the tibia and will benefit from revision of his left below-knee amputation with excision of the tibia and drainage of the abscess.  This is his best chance at potentially salvaging his below-knee amputation stump and hopefully preventing conversion to an above-knee amputation.  I have discussed the risks and benefits of this procedure with the patient and he voiced an understanding of the risks including the risk of continued infection despite our intervention delayed healing among other complications.  Plan is for him to undergo the procedure on Saturday.  The patient indicated an understanding of these issues and agreed to the plan and all questions were answered.     Thank you for allowing to participate in the patient's care.         Samer F. Najjar, MD    Vascular Surgery

## 2025-02-28 NOTE — PLAN OF CARE
Patient AOX4. Room air. ACHS. Carb control diet, tolerated well. Voiding via urinal/ freely. Passing flatus. PRN dilaudid given for pain management. PRN reglan given. IV abx given. Plan for surgery tomorrow. NPO midnight. Call light within reach.     Problem: Patient Centered Care  Goal: Patient preferences are identified and integrated in the patient's plan of care  Description: Interventions:  - What would you like us to know as we care for you?   - Provide timely, complete, and accurate information to patient/family  - Incorporate patient and family knowledge, values, beliefs, and cultural backgrounds into the planning and delivery of care  - Encourage patient/family to participate in care and decision-making at the level they choose  - Honor patient and family perspectives and choices  Outcome: Progressing     Problem: Patient/Family Goals  Goal: Patient/Family Long Term Goal  Description: Patient's Long Term Goal:     Interventions:  -   - See additional Care Plan goals for specific interventions  Outcome: Progressing  Goal: Patient/Family Short Term Goal  Description: Patient's Short Term Goal:     Interventions:   -  - See additional Care Plan goals for specific interventions  Outcome: Progressing     Problem: PAIN - ADULT  Goal: Verbalizes/displays adequate comfort level or patient's stated pain goal  Description: INTERVENTIONS:  - Encourage pt to monitor pain and request assistance  - Assess pain using appropriate pain scale  - Administer analgesics based on type and severity of pain and evaluate response  - Implement non-pharmacological measures as appropriate and evaluate response  - Consider cultural and social influences on pain and pain management  - Manage/alleviate anxiety  - Utilize distraction and/or relaxation techniques  - Monitor for opioid side effects  - Notify MD/LIP if interventions unsuccessful or patient reports new pain  - Anticipate increased pain with activity and pre-medicate as  appropriate  Outcome: Progressing     Problem: RISK FOR INFECTION - ADULT  Goal: Absence of fever/infection during anticipated neutropenic period  Description: INTERVENTIONS  - Monitor WBC  - Administer growth factors as ordered  - Implement neutropenic guidelines  Outcome: Progressing     Problem: SAFETY ADULT - FALL  Goal: Free from fall injury  Description: INTERVENTIONS:  - Assess pt frequently for physical needs  - Identify cognitive and physical deficits and behaviors that affect risk of falls.  - [place] fall precautions as indicated by assessment.  - Educate pt/family on patient safety including physical limitations  - Instruct pt to call for assistance with activity based on assessment  - Modify environment to reduce risk of injury  - Provide assistive devices as appropriate  - Consider OT/PT consult to assist with strengthening/mobility  - Encourage toileting schedule  Outcome: Progressing     Problem: DISCHARGE PLANNING  Goal: Discharge to home or other facility with appropriate resources  Description: INTERVENTIONS:  - Identify barriers to discharge w/pt and caregiver  - Include patient/family/discharge partner in discharge planning  - Arrange for needed discharge resources and transportation as appropriate  - Identify discharge learning needs (meds, wound care, etc)  - Arrange for interpreters to assist at discharge as needed  - Consider post-discharge preferences of patient/family/discharge partner  - Complete POLST form as appropriate  - Assess patient's ability to be responsible for managing their own health  - Refer to Case Management Department for coordinating discharge planning if the patient needs post-hospital services based on physician/LIP order or complex needs related to functional status, cognitive ability or social support system  Outcome: Progressing     Problem: Diabetes/Glucose Control  Goal: Glucose maintained within prescribed range  Description: INTERVENTIONS:  - Monitor Blood  Glucose as ordered  - Assess for signs and symptoms of hyperglycemia and hypoglycemia  - Administer ordered medications to maintain glucose within target range  - Assess barriers to adequate nutritional intake and initiate nutrition consult as needed  - Instruct patient on self management of diabetes  Outcome: Progressing

## 2025-02-28 NOTE — PROGRESS NOTES
[facility]  part of [facility]    Progress Note    Alejandro Mireles Jr. Patient Status:  Inpatient    1975 MRN [id number]   Location [facility] 4W/SW/SE Attending Mary Keller MD   Hosp Day # 5 PCP Fernanda Chapman MD     Chief Complaint: Stump infection    Subjective:     Constitutional: Negative.    HENT: Negative.     Eyes: Negative.    Respiratory: Negative.     Cardiovascular: Negative.    Gastrointestinal: Negative.    Endocrine: Negative.    Genitourinary: Negative.    Musculoskeletal: Negative.    Skin: Negative.    Neurological: Negative.    Hematological: Negative.  Does not bruise/bleed easily.   Psychiatric/Behavioral: Negative.  Negative for agitation.        Objective:   Blood pressure (!) 138/95, pulse 86, temperature 98.4 °F (36.9 °C), temperature source Oral, resp. rate 18, height 6' 3\" (1.905 m), weight 278 lb (126.1 kg), SpO2 95%.  Physical Exam  Vitals and nursing note reviewed.   Constitutional:       Appearance: Normal appearance.   HENT:      Head: Normocephalic and atraumatic.   Eyes:      Pupils: Pupils are equal, round, and reactive to light.   Cardiovascular:      Rate and Rhythm: Normal rate and regular rhythm.   Pulmonary:      Effort: Pulmonary effort is normal.      Breath sounds: Normal breath sounds.   Abdominal:      General: Abdomen is flat.      Palpations: Abdomen is soft.   Musculoskeletal:      Cervical back: Neck supple.   Skin:     Findings: Lesion present.   Neurological:      General: No focal deficit present.      Mental Status: He is alert and oriented to person, place, and time.   Psychiatric:         Mood and Affect: Mood normal.         Results:   Lab Results   Component Value Date    WBC 7.8 2025    HGB 12.5 (L) 2025    HCT 37.6 (L) 2025    .0 2025    CREATSERUM 1.90 (H) 2025    BUN 23 2025     2025    K 4.5 2025     2025    CO2 28.0 2025    GLU  142 (H) 02/28/2025    CA 8.5 (L) 02/28/2025       No results found.        Assessment & Plan:         1 Left BKA stump infection on Ancef  Pt seen by vascular for intervention tomorrow  Follow cultures   2 HTN BP stable  3 DM monitor acccuheck  4 HLD on statin  D/W family  Time spent 35 mins

## 2025-02-28 NOTE — PROGRESS NOTES
INFECTIOUS DISEASE PROGRESS NOTE  [facility]  part of [facility] ID PROGRESS NOTE    Alejandro Mireles Jr. Patient Status:  Inpatient    1975 MRN [id number]   Location [facility] 4W/SW/SE Attending Mary Keller MD   Hosp Day # 5 PCP Fernanda Chapman MD     Subjective:  ROS reviewed.Plans for OR tomorrow. Tolerating abx.    ASSESSMENT:    Antibiotics: Ancef  Vancomycin, Cefepime     # L BKA stump abscess with concerns for chronic OM with sinus tract               -  MRI  with distal tibia OM involving osteotomy margin + abscess (1.9 x 1.8cm) w/ sinus tract to skin, s/p bedside aspiration, cx ngtd; drainage cx- S.agalactiae, diptheroids               -  Refused IV abx given hx PICC assc DVT, s/p augmentin through                - Seen again on doppler US  (4.8 x 5.3 x 4.5cm)   -S/p IR aspiration , cx GBS   -Vascular surgery planning revision 3/1  # Hx R TMA abscess 2024- s/p I&D, ertapenem, dc 24  # Hx BLE OM (s/p L BKA , R TMA )  # DM, CKD  # HTN      PLAN:  -  Continue on ancef. Awaiting BKA revision tomorrow.  -  Local wound care.   -  Follow fever curve, wbc.  -  Reviewed labs, micro, imaging reports, available old records.  -  Case d/w patient, RN.     History of Present Illness:  49 year old male with PMH HTN, DM w/neuropathy/nephropathy, PAD, LLE OM (s/p L BKA ), R forefoot OM (s/p R TMA ), c/b R TMA OM/abscess (s/p I&D 24, ertapenem (EOT 24)c/b PICC assc RUE DVT))     Pt admitted at [facility] in 2024 with L BKA stump OM and abscess s/p bedside aspiration, cx ngtd. Admit drainage cx w/S.agalactiae and diptheroids. Was on zosyn. Pt was unwilling to undergo another course of iv abx d/t hx DVT and thus DC on augmentin through 25. Now presents to [facility] ER  with L stump swelling. He believes that these recurrent infections keep happening because of excessive sweating while wearing his prosthetic. There was  also a scab on his stump that he recently picked off. Endorses stump associated pain and swelling but reported drainage. No fevers or chills. Pt does not want any invasive surgical procedures or iv antibiotics. On admit, no fever. WBC 9.9. LLE dopplers neg for DVT but with abscess (4.8 x 5.3 x 4.5 cm). Bcx sent. Pt given 1x dose vancomycin + zosyn. ID consulted.     Physical Exam:  BP (!) 138/95 (BP Location: Left arm)   Pulse 86   Temp 98.4 °F (36.9 °C) (Oral)   Resp 18   Ht 6' 3\" (1.905 m)   Wt 278 lb (126.1 kg)   SpO2 95%   BMI 34.75 kg/m²     Gen:   Awake, in bed  HEENT:  EOMI, neck supple  CV/lungs:  RRR, CTAB  Abdom:  Soft, no TTP  Skin/extrem:  L BKA stump with open wound covered  Lines:  PIV+    Laboratory Data: Reviewed    Microbiology: Reviewed    Radiology: Reviewed      SUS[facility]  [phone]  2/28/2025

## 2025-03-01 ENCOUNTER — ANESTHESIA (OUTPATIENT)
Dept: SURGERY | Facility: HOSPITAL | Age: 50
End: 2025-03-01
Payer: MEDICARE

## 2025-03-01 LAB
ANTIBODY SCREEN: NEGATIVE
GLUCOSE BLDC GLUCOMTR-MCNC: 114 MG/DL (ref 70–99)
GLUCOSE BLDC GLUCOMTR-MCNC: 120 MG/DL (ref 70–99)
GLUCOSE BLDC GLUCOMTR-MCNC: 146 MG/DL (ref 70–99)
GLUCOSE BLDC GLUCOMTR-MCNC: 170 MG/DL (ref 70–99)
RH BLOOD TYPE: POSITIVE
RH BLOOD TYPE: POSITIVE

## 2025-03-01 PROCEDURE — 87176 TISSUE HOMOGENIZATION CULTR: CPT | Performed by: SURGERY

## 2025-03-01 PROCEDURE — 87186 SC STD MICRODIL/AGAR DIL: CPT | Performed by: SURGERY

## 2025-03-01 PROCEDURE — 86901 BLOOD TYPING SEROLOGIC RH(D): CPT | Performed by: SURGERY

## 2025-03-01 PROCEDURE — 82962 GLUCOSE BLOOD TEST: CPT

## 2025-03-01 PROCEDURE — 88305 TISSUE EXAM BY PATHOLOGIST: CPT | Performed by: SURGERY

## 2025-03-01 PROCEDURE — 87205 SMEAR GRAM STAIN: CPT | Performed by: SURGERY

## 2025-03-01 PROCEDURE — 86850 RBC ANTIBODY SCREEN: CPT | Performed by: SURGERY

## 2025-03-01 PROCEDURE — 87070 CULTURE OTHR SPECIMN AEROBIC: CPT | Performed by: SURGERY

## 2025-03-01 PROCEDURE — 87077 CULTURE AEROBIC IDENTIFY: CPT | Performed by: SURGERY

## 2025-03-01 PROCEDURE — 88311 DECALCIFY TISSUE: CPT | Performed by: SURGERY

## 2025-03-01 PROCEDURE — 86900 BLOOD TYPING SEROLOGIC ABO: CPT | Performed by: SURGERY

## 2025-03-01 PROCEDURE — 0Y6J0Z1 DETACHMENT AT LEFT LOWER LEG, HIGH, OPEN APPROACH: ICD-10-PCS | Performed by: SURGERY

## 2025-03-01 PROCEDURE — 87075 CULTR BACTERIA EXCEPT BLOOD: CPT | Performed by: SURGERY

## 2025-03-01 PROCEDURE — 88307 TISSUE EXAM BY PATHOLOGIST: CPT | Performed by: SURGERY

## 2025-03-01 RX ORDER — NICOTINE POLACRILEX 4 MG
15 LOZENGE BUCCAL
Status: DISCONTINUED | OUTPATIENT
Start: 2025-03-01 | End: 2025-03-01 | Stop reason: HOSPADM

## 2025-03-01 RX ORDER — MORPHINE SULFATE 4 MG/ML
2 INJECTION, SOLUTION INTRAMUSCULAR; INTRAVENOUS EVERY 10 MIN PRN
Status: DISCONTINUED | OUTPATIENT
Start: 2025-03-01 | End: 2025-03-01 | Stop reason: HOSPADM

## 2025-03-01 RX ORDER — MORPHINE SULFATE 10 MG/ML
6 INJECTION, SOLUTION INTRAMUSCULAR; INTRAVENOUS EVERY 10 MIN PRN
Status: DISCONTINUED | OUTPATIENT
Start: 2025-03-01 | End: 2025-03-01 | Stop reason: HOSPADM

## 2025-03-01 RX ORDER — HYDROMORPHONE HYDROCHLORIDE 1 MG/ML
0.2 INJECTION, SOLUTION INTRAMUSCULAR; INTRAVENOUS; SUBCUTANEOUS EVERY 5 MIN PRN
Status: DISCONTINUED | OUTPATIENT
Start: 2025-03-01 | End: 2025-03-01 | Stop reason: HOSPADM

## 2025-03-01 RX ORDER — ENOXAPARIN SODIUM 100 MG/ML
40 INJECTION SUBCUTANEOUS DAILY
Status: DISCONTINUED | OUTPATIENT
Start: 2025-03-01 | End: 2025-03-04

## 2025-03-01 RX ORDER — HYDROMORPHONE HYDROCHLORIDE 1 MG/ML
1.2 INJECTION, SOLUTION INTRAMUSCULAR; INTRAVENOUS; SUBCUTANEOUS EVERY 4 HOURS PRN
Status: DISCONTINUED | OUTPATIENT
Start: 2025-03-01 | End: 2025-03-04

## 2025-03-01 RX ORDER — SODIUM CHLORIDE, SODIUM LACTATE, POTASSIUM CHLORIDE, CALCIUM CHLORIDE 600; 310; 30; 20 MG/100ML; MG/100ML; MG/100ML; MG/100ML
INJECTION, SOLUTION INTRAVENOUS CONTINUOUS
Status: DISCONTINUED | OUTPATIENT
Start: 2025-03-01 | End: 2025-03-01 | Stop reason: HOSPADM

## 2025-03-01 RX ORDER — MORPHINE SULFATE 4 MG/ML
4 INJECTION, SOLUTION INTRAMUSCULAR; INTRAVENOUS EVERY 10 MIN PRN
Status: DISCONTINUED | OUTPATIENT
Start: 2025-03-01 | End: 2025-03-01 | Stop reason: HOSPADM

## 2025-03-01 RX ORDER — HYDROCODONE BITARTRATE AND ACETAMINOPHEN 5; 325 MG/1; MG/1
2 TABLET ORAL EVERY 4 HOURS PRN
Status: DISCONTINUED | OUTPATIENT
Start: 2025-03-01 | End: 2025-03-04

## 2025-03-01 RX ORDER — NALOXONE HYDROCHLORIDE 0.4 MG/ML
0.08 INJECTION, SOLUTION INTRAMUSCULAR; INTRAVENOUS; SUBCUTANEOUS AS NEEDED
Status: DISCONTINUED | OUTPATIENT
Start: 2025-03-01 | End: 2025-03-01 | Stop reason: HOSPADM

## 2025-03-01 RX ORDER — HYDROMORPHONE HYDROCHLORIDE 1 MG/ML
0.6 INJECTION, SOLUTION INTRAMUSCULAR; INTRAVENOUS; SUBCUTANEOUS EVERY 5 MIN PRN
Status: DISCONTINUED | OUTPATIENT
Start: 2025-03-01 | End: 2025-03-01 | Stop reason: HOSPADM

## 2025-03-01 RX ORDER — DEXTROSE MONOHYDRATE 25 G/50ML
50 INJECTION, SOLUTION INTRAVENOUS
Status: DISCONTINUED | OUTPATIENT
Start: 2025-03-01 | End: 2025-03-01 | Stop reason: HOSPADM

## 2025-03-01 RX ORDER — PROCHLORPERAZINE EDISYLATE 5 MG/ML
5 INJECTION INTRAMUSCULAR; INTRAVENOUS EVERY 8 HOURS PRN
Status: DISCONTINUED | OUTPATIENT
Start: 2025-03-01 | End: 2025-03-04

## 2025-03-01 RX ORDER — HYDROCODONE BITARTRATE AND ACETAMINOPHEN 5; 325 MG/1; MG/1
1 TABLET ORAL EVERY 4 HOURS PRN
Status: DISCONTINUED | OUTPATIENT
Start: 2025-03-01 | End: 2025-03-04

## 2025-03-01 RX ORDER — HYDROMORPHONE HYDROCHLORIDE 1 MG/ML
0.2 INJECTION, SOLUTION INTRAMUSCULAR; INTRAVENOUS; SUBCUTANEOUS EVERY 2 HOUR PRN
Status: DISCONTINUED | OUTPATIENT
Start: 2025-03-01 | End: 2025-03-04

## 2025-03-01 RX ORDER — HYDROMORPHONE HYDROCHLORIDE 1 MG/ML
0.4 INJECTION, SOLUTION INTRAMUSCULAR; INTRAVENOUS; SUBCUTANEOUS EVERY 5 MIN PRN
Status: DISCONTINUED | OUTPATIENT
Start: 2025-03-01 | End: 2025-03-01 | Stop reason: HOSPADM

## 2025-03-01 RX ORDER — ACETAMINOPHEN 325 MG/1
650 TABLET ORAL EVERY 4 HOURS PRN
Status: DISCONTINUED | OUTPATIENT
Start: 2025-03-01 | End: 2025-03-04

## 2025-03-01 RX ORDER — HYDROMORPHONE HYDROCHLORIDE 1 MG/ML
0.8 INJECTION, SOLUTION INTRAMUSCULAR; INTRAVENOUS; SUBCUTANEOUS EVERY 2 HOUR PRN
Status: DISCONTINUED | OUTPATIENT
Start: 2025-03-01 | End: 2025-03-04

## 2025-03-01 RX ORDER — NICOTINE POLACRILEX 4 MG
30 LOZENGE BUCCAL
Status: DISCONTINUED | OUTPATIENT
Start: 2025-03-01 | End: 2025-03-01 | Stop reason: HOSPADM

## 2025-03-01 RX ORDER — SODIUM CHLORIDE, SODIUM LACTATE, POTASSIUM CHLORIDE, CALCIUM CHLORIDE 600; 310; 30; 20 MG/100ML; MG/100ML; MG/100ML; MG/100ML
INJECTION, SOLUTION INTRAVENOUS CONTINUOUS PRN
Status: DISCONTINUED | OUTPATIENT
Start: 2025-03-01 | End: 2025-03-01 | Stop reason: SURG

## 2025-03-01 RX ORDER — ONDANSETRON 2 MG/ML
INJECTION INTRAMUSCULAR; INTRAVENOUS AS NEEDED
Status: DISCONTINUED | OUTPATIENT
Start: 2025-03-01 | End: 2025-03-01 | Stop reason: SURG

## 2025-03-01 RX ORDER — HYDROMORPHONE HYDROCHLORIDE 1 MG/ML
0.4 INJECTION, SOLUTION INTRAMUSCULAR; INTRAVENOUS; SUBCUTANEOUS EVERY 2 HOUR PRN
Status: DISCONTINUED | OUTPATIENT
Start: 2025-03-01 | End: 2025-03-04

## 2025-03-01 RX ORDER — BUPIVACAINE HYDROCHLORIDE 5 MG/ML
INJECTION, SOLUTION EPIDURAL; INTRACAUDAL AS NEEDED
Status: DISCONTINUED | OUTPATIENT
Start: 2025-03-01 | End: 2025-03-01 | Stop reason: HOSPADM

## 2025-03-01 RX ORDER — ONDANSETRON 2 MG/ML
4 INJECTION INTRAMUSCULAR; INTRAVENOUS EVERY 6 HOURS PRN
Status: DISCONTINUED | OUTPATIENT
Start: 2025-03-01 | End: 2025-03-04

## 2025-03-01 RX ORDER — GLYCOPYRROLATE 0.2 MG/ML
INJECTION, SOLUTION INTRAMUSCULAR; INTRAVENOUS AS NEEDED
Status: DISCONTINUED | OUTPATIENT
Start: 2025-03-01 | End: 2025-03-01 | Stop reason: SURG

## 2025-03-01 RX ADMIN — ONDANSETRON 4 MG: 2 INJECTION INTRAMUSCULAR; INTRAVENOUS at 09:13:00

## 2025-03-01 RX ADMIN — SODIUM CHLORIDE, SODIUM LACTATE, POTASSIUM CHLORIDE, CALCIUM CHLORIDE: 600; 310; 30; 20 INJECTION, SOLUTION INTRAVENOUS at 09:09:00

## 2025-03-01 RX ADMIN — SODIUM CHLORIDE, SODIUM LACTATE, POTASSIUM CHLORIDE, CALCIUM CHLORIDE: 600; 310; 30; 20 INJECTION, SOLUTION INTRAVENOUS at 10:47:00

## 2025-03-01 RX ADMIN — GLYCOPYRROLATE 0.4 MG: 0.2 INJECTION, SOLUTION INTRAMUSCULAR; INTRAVENOUS at 09:13:00

## 2025-03-01 NOTE — ANESTHESIA PROCEDURE NOTES
Airway  Date/Time: 3/1/2025 9:14 AM  Urgency: elective    Airway not difficult    General Information and Staff    Patient location during procedure: OR  Anesthesiologist: Brijesh Finley MD  Performed: anesthesiologist   Performed by: Brijesh Finley MD  Authorized by: Brijesh Finley MD      Indications and Patient Condition  Indications for airway management: anesthesia  Spontaneous Ventilation: absent  Sedation level: deep  Preoxygenated: yes  Patient position: sniffing  Mask difficulty assessment: 1 - vent by mask  Planned trial extubation    Final Airway Details  Final airway type: supraglottic airway      Successful airway: classic  Size 5       Number of attempts at approach: 1

## 2025-03-01 NOTE — PLAN OF CARE
Problem: PAIN - ADULT  Goal: Verbalizes/displays adequate comfort level or patient's stated pain goal  Description: INTERVENTIONS:  - Encourage pt to monitor pain and request assistance  - Assess pain using appropriate pain scale  - Administer analgesics based on type and severity of pain and evaluate response  - Implement non-pharmacological measures as appropriate and evaluate response  - Consider cultural and social influences on pain and pain management  - Manage/alleviate anxiety  - Utilize distraction and/or relaxation techniques  - Monitor for opioid side effects  - Notify MD/LIP if interventions unsuccessful or patient reports new pain  - Anticipate increased pain with activity and pre-medicate as appropriate  Outcome: Progressing     Problem: RISK FOR INFECTION - ADULT  Goal: Absence of fever/infection during anticipated neutropenic period  Description: INTERVENTIONS  - Monitor WBC  - Administer growth factors as ordered  - Implement neutropenic guidelines  Outcome: Progressing     Problem: SAFETY ADULT - FALL  Goal: Free from fall injury  Description: INTERVENTIONS:  - Assess pt frequently for physical needs  - Identify cognitive and physical deficits and behaviors that affect risk of falls.  - [place] fall precautions as indicated by assessment.  - Educate pt/family on patient safety including physical limitations  - Instruct pt to call for assistance with activity based on assessment  - Modify environment to reduce risk of injury  - Provide assistive devices as appropriate  - Consider OT/PT consult to assist with strengthening/mobility  - Encourage toileting schedule  Outcome: Progressing

## 2025-03-01 NOTE — ANESTHESIA POSTPROCEDURE EVALUATION
Patient: Alejandro Mireles Jr.    Procedure Summary       Date: 03/01/25 Room / Location: [facility] MAIN OR 04 / [facility] MAIN OR    Anesthesia Start: 0909 Anesthesia Stop:     Procedure: Left below knee amputation revision (Left: Lower Leg) Diagnosis: (Osteomyelitis)    Surgeons: Najjar, Samer F, MD Anesthesiologist: Brijesh Finley MD    Anesthesia Type: general ASA Status: 3            Anesthesia Type: general    Vitals Value Taken Time   BP 98/69 03/01/25 1046   Temp 97.9 °F (36.6 °C) 03/01/25 1045   Pulse 86 03/01/25 1047   Resp 12 03/01/25 1047   SpO2 96 % 03/01/25 1047   Vitals shown include unfiled device data.    EM AN Post Evaluation:   Patient Evaluated in PACU  Patient Participation: complete - patient participated  Level of Consciousness: awake  Pain Management: adequate  Airway Patency:patent  Dental exam unchanged from preop  Yes    Cardiovascular Status: acceptable  Respiratory Status: acceptable  Postoperative Hydration acceptable  Comments: BS-120      Brijesh Finley MD  3/1/2025 10:47 AM

## 2025-03-01 NOTE — OPERATIVE REPORT
[facility]     PATIENTS NAME: Alejandro Mireles Jr.  ATTENDING PHYSICIAN: Mary Keller MD  OPERATING PHYSICIAN: Samer F Najjar, MD  CSN: [id number]     LOCATION:  OR  MRN: [id number]    YOB: 1975  ADMISSION DATE: 2/22/2025  OPERATION DATE: 3/1/2025                OPERATIVE REPORT     PRE-OPERATIVE DIAGNOSIS: Osteomyelitis of left below-knee amputation stump    POST-OPERATIVE DIAGNOSIS: Same as above.    PROCEDURE PERFORMED: Revision of the left below-knee amputation with re-amputation of the tibia    ANESTHESIA: General    SURGEON: Samer F Najjar, MD    ASSISTANT: Richi Giles SA    EBL: 30 ml    SPECIMENS: * No specimens in log *    COMPLICATIONS: None    SUMMARY OF CASE: There was a sinus tract leading to the tibia but there was no moriah pus present.  The tissues around the tibia and the sinus tract were all debrided and I we amputated the tibia by about 2 cm with a bevel.  The fibula was too deep and well incorporated and given his large body mass I felt that it would not require more proximal amputation for now.  A ORQUIDEA drain was placed    INDICATIONS: The patient is a 49 year old male with a history of a left below-knee amputation that has developed recurrent episodes of osteomyelitis and has now presented to the hospital with another episode.  A revision of the stump was recommended with the amputation of the tibia and possibly the fibula. I did caution the patient that because he does have a deep tissue infection there is always a chance of recurrence despite our best efforts.  Ideally once the infected bone is removed and the tissues are debrided he can continue with IV antibiotics and hopefully achieve full healing.  The risks and benefits of the amputation were explained to the patient and those included the risks of MI, need for transfusion, bleeding, painful neuroma, wound complications, infection, and need for more open surgery among other complications. Patient understood and  all questions were answered. The patient has expressed a wish to proceed.     DESCRIPTION OF PROCEDURE:  The patient was placed supine. The left lower extremity was prepped and draped in the usual sterile fashion.  The skin and subcutaneous tissues and incision site stump were incised down to the fascia.  There was moderate scar tissue present and the majority of the tissues surrounding the tibia were healthy except for a sinus tract that led to the tibia.  There was no moriah pus present.  Therefore an anterior flap was elevated in order to expose the tibia.  Using a periosteal elevator, the tibial periosteum was stripped proximally for about 2 cm. The tibia was then transected with an electric saw 2 cm proximal to the skin incision with an anterior bevel. The fibula was then exposed, dissected circumferentially, and transected with an electric saw 2 cm proximal to the tibial division. Sharp bony edges were then filed, eliminating any bony prominences over the anterior aspect of the tibia.  The wound was copiously irrigated with xperience antibiotic solution.  Cultures were then obtained.  The fascia of the anterior and posterior muscle flaps were approximated with interrupted 2-0 Vicryl sutures.  A #10 ORQUIDEA drain was placed overlying the muscle layer.  The skin was approximated with interrupted 2-0 nylon sutures and was then injected with 0.5% Marcaine solution and dressed with 4 × 4 gauze, Kerlix, and an Ace bandage.   The patient tolerated the procedure well and was taken to the postanesthesia care unit in stable condition.

## 2025-03-01 NOTE — PLAN OF CARE
Patient is A&Ox4. RA. Ancef completed. NPO since midnight. Q6 accuchecks. Voiding via urinal. PRN Dilaudid for pain management. Up by 1/w. Call light within reach, frequent rounding. Safety measures in place. Plan for Debridement in AM.     Problem: Patient Centered Care  Goal: Patient preferences are identified and integrated in the patient's plan of care  Description: Interventions:  - What would you like us to know as we care for you?   - Provide timely, complete, and accurate information to patient/family  - Incorporate patient and family knowledge, values, beliefs, and cultural backgrounds into the planning and delivery of care  - Encourage patient/family to participate in care and decision-making at the level they choose  - Honor patient and family perspectives and choices  Outcome: Progressing     Problem: Patient/Family Goals  Goal: Patient/Family Long Term Goal  Description: Patient's Long Term Goal:     Interventions:  -   - See additional Care Plan goals for specific interventions  Outcome: Progressing  Goal: Patient/Family Short Term Goal  Description: Patient's Short Term Goal:     Interventions:     - See additional Care Plan goals for specific interventions  Outcome: Progressing     Problem: PAIN - ADULT  Goal: Verbalizes/displays adequate comfort level or patient's stated pain goal  Description: INTERVENTIONS:  - Encourage pt to monitor pain and request assistance  - Assess pain using appropriate pain scale  - Administer analgesics based on type and severity of pain and evaluate response  - Implement non-pharmacological measures as appropriate and evaluate response  - Consider cultural and social influences on pain and pain management  - Manage/alleviate anxiety  - Utilize distraction and/or relaxation techniques  - Monitor for opioid side effects  - Notify MD/LIP if interventions unsuccessful or patient reports new pain  - Anticipate increased pain with activity and pre-medicate as  appropriate  Outcome: Progressing     Problem: RISK FOR INFECTION - ADULT  Goal: Absence of fever/infection during anticipated neutropenic period  Description: INTERVENTIONS  - Monitor WBC  - Administer growth factors as ordered  - Implement neutropenic guidelines  Outcome: Progressing     Problem: SAFETY ADULT - FALL  Goal: Free from fall injury  Description: INTERVENTIONS:  - Assess pt frequently for physical needs  - Identify cognitive and physical deficits and behaviors that affect risk of falls.  - [place] fall precautions as indicated by assessment.  - Educate pt/family on patient safety including physical limitations  - Instruct pt to call for assistance with activity based on assessment  - Modify environment to reduce risk of injury  - Provide assistive devices as appropriate  - Consider OT/PT consult to assist with strengthening/mobility  - Encourage toileting schedule  Outcome: Progressing     Problem: DISCHARGE PLANNING  Goal: Discharge to home or other facility with appropriate resources  Description: INTERVENTIONS:  - Identify barriers to discharge w/pt and caregiver  - Include patient/family/discharge partner in discharge planning  - Arrange for needed discharge resources and transportation as appropriate  - Identify discharge learning needs (meds, wound care, etc)  - Arrange for interpreters to assist at discharge as needed  - Consider post-discharge preferences of patient/family/discharge partner  - Complete POLST form as appropriate  - Assess patient's ability to be responsible for managing their own health  - Refer to Case Management Department for coordinating discharge planning if the patient needs post-hospital services based on physician/LIP order or complex needs related to functional status, cognitive ability or social support system  Outcome: Progressing     Problem: Diabetes/Glucose Control  Goal: Glucose maintained within prescribed range  Description: INTERVENTIONS:  - Monitor Blood  Glucose as ordered  - Assess for signs and symptoms of hyperglycemia and hypoglycemia  - Administer ordered medications to maintain glucose within target range  - Assess barriers to adequate nutritional intake and initiate nutrition consult as needed  - Instruct patient on self management of diabetes  Outcome: Progressing

## 2025-03-01 NOTE — ANESTHESIA PREPROCEDURE EVALUATION
Anesthesia PreOp Note    HPI:     Alejandro Mireles Jr. is a 49 year old male who presents for preoperative consultation requested by: Najjar, Samer F, MD    Date of Surgery: 2/22/2025 - 3/1/2025    Procedure(s):  Left below knee amputation  Indication: Osteomyelitis    Relevant Problems   No relevant active problems       NPO:  Last Liquid Consumption Date: 02/28/25  Last Liquid Consumption Time: 2300  Last Solid Consumption Date: 02/28/25  Last Solid Consumption Time: 1900  Last Liquid Consumption Date: 02/28/25          History Review:  Patient Active Problem List    Diagnosis Date Noted    Abscess 02/23/2025    Leg swelling 02/22/2025       Past Medical History:    Deep vein thrombosis (HCC)    from PICC line; no anticoagulants    Diabetes (HCC)    Visual impairment    L eye; glasses       Past Surgical History:   Procedure Laterality Date    Below knee leg amputation Left 2019    estimated       Prescriptions Prior to Admission[1]  Current Medications and Prescriptions Ordered in Epic[2]    Allergies[3]    No family history on file.  Social History     Socioeconomic History    Marital status: Legally    Tobacco Use    Smoking status: Never    Smokeless tobacco: Never   Vaping Use    Vaping status: Never Used   Substance and Sexual Activity    Alcohol use: Not Currently    Drug use: Never       Available pre-op labs reviewed.  Lab Results   Component Value Date    WBC 7.8 02/28/2025    RBC 4.68 02/28/2025    HGB 12.5 (L) 02/28/2025    HCT 37.6 (L) 02/28/2025    MCV 80.3 02/28/2025    MCH 26.7 02/28/2025    MCHC 33.2 02/28/2025    RDW 16.2 (H) 02/28/2025    .0 02/28/2025     Lab Results   Component Value Date     02/28/2025    K 4.5 02/28/2025     02/28/2025    CO2 28.0 02/28/2025    BUN 23 02/28/2025    CREATSERUM 1.90 (H) 02/28/2025     (H) 02/28/2025    PGLU 170 (H) 03/01/2025    CA 8.5 (L) 02/28/2025          Vital Signs:  Body mass index is 34.75 kg/m².   height is 1.905  m (6' 3\") and weight is 126.1 kg (278 lb). His oral temperature is 98.2 °F (36.8 °C). His blood pressure is 152/84 and his pulse is 89. His respiration is 18 and oxygen saturation is 97%.   Vitals:    02/28/25 1916 03/01/25 0543 03/01/25 0732 03/01/25 0757   BP: 126/82 (!) 141/95 137/89 152/84   Pulse: 88 89 81 89   Resp: 18 18 18    Temp: 98.3 °F (36.8 °C) 98 °F (36.7 °C) 98.2 °F (36.8 °C)    TempSrc: Oral Temporal Oral    SpO2: 98% 98% 98% 97%   Weight:       Height:            Anesthesia Evaluation     Patient summary reviewed and Nursing notes reviewed    Airway   Mallampati: II  TM distance: >3 FB  Neck ROM: limited  Dental    (+) partials    Pulmonary    (+) decreased breath sounds    ROS comment: smoker  Cardiovascular - normal exam  (+) hypertension    Neuro/Psych      Comments: Diabetic neuropathy    GI/Hepatic/Renal      Endo/Other    (+) diabetes mellitus poorly controlled    Comments: This is a revision of L BTK amp, possibly infected  Morbid obesity  Abdominal   (+) obese                 Anesthesia Plan:   ASA:  3  Plan:   General  Airway:  LMA  Post-op Pain Management: IV analgesics  Informed Consent Plan and Risks Discussed With:  Patient  Use of Blood Products Discussed With:  Patient  Blood Product Use Consented    Discussed plan with:  Surgeon      I have informed Alejandro Solomon Mireles Jr. and/or legal guardian or family member of the nature of the anesthetic plan, benefits, risks including possible dental damage if relevant, major complications, and any alternative forms of anesthetic management.   All of the patient's questions were answered to the best of my ability. The patient desires the anesthetic management as planned.  Brijesh Finley MD  3/1/2025 8:26 AM  Present on Admission:  **None**           [1]   Medications Prior to Admission   Medication Sig Dispense Refill Last Dose/Taking    losartan 50 MG Oral Tab Take 1 tablet (50 mg total) by mouth daily.   2/22/2025    amLODIPine 5 MG  Oral Tab Take 1 tablet (5 mg total) by mouth daily.   2/22/2025    insulin NPH & regular 70/30 (70-30) 100 Units/mL Subcutaneous Suspension Inject 20 Units into the skin Before Dinner.   2/22/2025    atorvastatin 40 MG Oral Tab Take 1 tablet (40 mg total) by mouth daily.   2/22/2025    insulin aspart 100 Units/mL Injection Solution Inject 20 Units into the skin 2 (two) times daily before meals.   2/22/2025    Multiple Vitamin (ONE-A-DAY MENS OR) Take by mouth daily.      [2]   Current Facility-Administered Medications Ordered in Epic   Medication Dose Route Frequency Provider Last Rate Last Admin    ceFAZolin (Ancef) 2g in 10mL IV syringe premix  2 g Intravenous Q8H Suzanne Calderón PA-C 120 mL/hr at 03/01/25 0544 2 g at 03/01/25 0544    [Transfer Hold] amLODIPine (Norvasc) tab 5 mg  5 mg Oral Daily Mary Keller MD   5 mg at 02/28/25 0930    [Transfer Hold] atorvastatin (Lipitor) tab 40 mg  40 mg Oral Nightly Mary Keller MD   40 mg at 02/28/25 2010    [Transfer Hold] losartan (Cozaar) tab 50 mg  50 mg Oral Daily Mary Keller MD   50 mg at 02/28/25 0930    [Transfer Hold] multivitamin (Tab-A-Michael/Beta Carotene) tab 1 tablet  1 tablet Oral Daily Mary Keller MD   1 tablet at 02/28/25 0930    [Transfer Hold] insulin aspart (NovoLOG) 100 Units/mL FlexPen 20 Units  20 Units Subcutaneous BID with meals Mary Keller MD   20 Units at 02/28/25 1611    [Transfer Hold] HYDROmorphone (Dilaudid) 1 MG/ML injection 1 mg  1 mg Intravenous Q4H PRN Mary Keller MD   1 mg at 02/28/25 1303    [Transfer Hold] ondansetron (Zofran) 4 MG/2ML injection 4 mg  4 mg Intravenous Q6H PRN Mary Keller MD   4 mg at 02/26/25 2350    [Transfer Hold] metoclopramide (Reglan) 5 mg/mL injection 5 mg  5 mg Intravenous TID PRN Mary Keller MD   5 mg at 02/27/25 1015    [Transfer Hold] insulin aspart (NovoLOG) 100 Units/mL FlexPen 1-5 Units  1-5 Units Subcutaneous TID CC and HS Mary Keller MD   4 Units  at 02/28/25 1610     No current [facility]-ordered outpatient medications on file.   [3] No Known Allergies

## 2025-03-01 NOTE — PROGRESS NOTES
[facility]  part of [facility]    Progress Note    Alejandro Mireles Jr. Patient Status:  Inpatient    1975 MRN [id number]   Location [facility] 4W/SW/SE Attending Mary Keller MD   Hosp Day # 6 PCP Fernanda Chapman MD     Chief Complaint: Stump infection    Subjective:     Constitutional: Negative.    HENT: Negative.     Eyes: Negative.    Respiratory: Negative.     Cardiovascular: Negative.    Gastrointestinal: Negative.    Endocrine: Negative.    Genitourinary: Negative.    Musculoskeletal: Negative.    Skin: Negative.    Neurological: Negative.    Hematological: Negative.  Does not bruise/bleed easily.   Psychiatric/Behavioral: Negative.  Negative for agitation.        Objective:   Blood pressure 152/84, pulse 89, temperature 98.2 °F (36.8 °C), temperature source Oral, resp. rate 18, height 6' 3\" (1.905 m), weight 278 lb (126.1 kg), SpO2 97%.  Physical Exam  Vitals and nursing note reviewed.   Constitutional:       Appearance: Normal appearance.   HENT:      Head: Normocephalic and atraumatic.   Eyes:      Pupils: Pupils are equal, round, and reactive to light.   Cardiovascular:      Rate and Rhythm: Normal rate and regular rhythm.   Pulmonary:      Effort: Pulmonary effort is normal.      Breath sounds: Normal breath sounds.   Abdominal:      General: Abdomen is flat.      Palpations: Abdomen is soft.   Musculoskeletal:      Cervical back: Neck supple.   Skin:     Findings: Lesion present.   Neurological:      General: No focal deficit present.      Mental Status: He is alert and oriented to person, place, and time.   Psychiatric:         Mood and Affect: Mood normal.         Results:   Lab Results   Component Value Date    WBC 7.8 2025    HGB 12.5 (L) 2025    HCT 37.6 (L) 2025    .0 2025    CREATSERUM 1.90 (H) 2025    BUN 23 2025     2025    K 4.5 2025     2025    CO2 28.0 2025      (H) 02/28/2025    CA 8.5 (L) 02/28/2025       No results found.        Assessment & Plan:         1 Left BKA stump infection on vanco and cefepime  For BKA revision today  Follow cultures   2 HTN BP stable  3 DM monitor acccuheck  4 HLD on statin  Time spent 35 mins

## 2025-03-01 NOTE — PLAN OF CARE
Patient resting in bed w/ call light within reach. Pain being controlled w/ PRN dilaudid. Tolerating diet, is achs. Using urinal for voiding. Scds at bedside. Plan is for surgery tomorrow, to be NPO at midnight.    Problem: Patient Centered Care  Goal: Patient preferences are identified and integrated in the patient's plan of care  Description: Interventions:  - What would you like us to know as we care for you? I want to go home   - Provide timely, complete, and accurate information to patient/family  - Incorporate patient and family knowledge, values, beliefs, and cultural backgrounds into the planning and delivery of care  - Encourage patient/family to participate in care and decision-making at the level they choose  - Honor patient and family perspectives and choices  Outcome: Progressing     Problem: Patient/Family Goals  Goal: Patient/Family Long Term Goal  Description: Patient's Long Term Goal: To not have infection    Interventions:  - surgery  - antibiotics   - See additional Care Plan goals for specific interventions  Outcome: Progressing  Goal: Patient/Family Short Term Goal  Description: Patient's Short Term Goal: to go home     Interventions:   - follow plan of care  - See additional Care Plan goals for specific interventions  Outcome: Progressing     Problem: PAIN - ADULT  Goal: Verbalizes/displays adequate comfort level or patient's stated pain goal  Description: INTERVENTIONS:  - Encourage pt to monitor pain and request assistance  - Assess pain using appropriate pain scale  - Administer analgesics based on type and severity of pain and evaluate response  - Implement non-pharmacological measures as appropriate and evaluate response  - Consider cultural and social influences on pain and pain management  - Manage/alleviate anxiety  - Utilize distraction and/or relaxation techniques  - Monitor for opioid side effects  - Notify MD/LIP if interventions unsuccessful or patient reports new pain  -  Anticipate increased pain with activity and pre-medicate as appropriate  Outcome: Progressing     Problem: RISK FOR INFECTION - ADULT  Goal: Absence of fever/infection during anticipated neutropenic period  Description: INTERVENTIONS  - Monitor WBC  - Administer growth factors as ordered  - Implement neutropenic guidelines  Outcome: Progressing     Problem: SAFETY ADULT - FALL  Goal: Free from fall injury  Description: INTERVENTIONS:  - Assess pt frequently for physical needs  - Identify cognitive and physical deficits and behaviors that affect risk of falls.  - [place] fall precautions as indicated by assessment.  - Educate pt/family on patient safety including physical limitations  - Instruct pt to call for assistance with activity based on assessment  - Modify environment to reduce risk of injury  - Provide assistive devices as appropriate  - Consider OT/PT consult to assist with strengthening/mobility  - Encourage toileting schedule  Outcome: Progressing     Problem: DISCHARGE PLANNING  Goal: Discharge to home or other facility with appropriate resources  Description: INTERVENTIONS:  - Identify barriers to discharge w/pt and caregiver  - Include patient/family/discharge partner in discharge planning  - Arrange for needed discharge resources and transportation as appropriate  - Identify discharge learning needs (meds, wound care, etc)  - Arrange for interpreters to assist at discharge as needed  - Consider post-discharge preferences of patient/family/discharge partner  - Complete POLST form as appropriate  - Assess patient's ability to be responsible for managing their own health  - Refer to Case Management Department for coordinating discharge planning if the patient needs post-hospital services based on physician/LIP order or complex needs related to functional status, cognitive ability or social support system  Outcome: Progressing

## 2025-03-02 LAB
ANION GAP SERPL CALC-SCNC: 6 MMOL/L (ref 0–18)
BUN BLD-MCNC: 29 MG/DL (ref 9–23)
BUN/CREAT SERPL: 9.5 (ref 10–20)
CALCIUM BLD-MCNC: 8 MG/DL (ref 8.7–10.4)
CHLORIDE SERPL-SCNC: 107 MMOL/L (ref 98–112)
CO2 SERPL-SCNC: 24 MMOL/L (ref 21–32)
CREAT BLD-MCNC: 3.04 MG/DL
DEPRECATED RDW RBC AUTO: 49.2 FL (ref 35.1–46.3)
EGFRCR SERPLBLD CKD-EPI 2021: 24 ML/MIN/1.73M2 (ref 60–?)
ERYTHROCYTE [DISTWIDTH] IN BLOOD BY AUTOMATED COUNT: 16.5 % (ref 11–15)
GLUCOSE BLD-MCNC: 217 MG/DL (ref 70–99)
GLUCOSE BLDC GLUCOMTR-MCNC: 105 MG/DL (ref 70–99)
GLUCOSE BLDC GLUCOMTR-MCNC: 175 MG/DL (ref 70–99)
GLUCOSE BLDC GLUCOMTR-MCNC: 176 MG/DL (ref 70–99)
GLUCOSE BLDC GLUCOMTR-MCNC: 181 MG/DL (ref 70–99)
HCT VFR BLD AUTO: 35.6 %
HGB BLD-MCNC: 11.5 G/DL
MCH RBC QN AUTO: 26.4 PG (ref 26–34)
MCHC RBC AUTO-ENTMCNC: 32.3 G/DL (ref 31–37)
MCV RBC AUTO: 81.7 FL
OSMOLALITY SERPL CALC.SUM OF ELEC: 296 MOSM/KG (ref 275–295)
PLATELET # BLD AUTO: 355 10(3)UL (ref 150–450)
POTASSIUM SERPL-SCNC: 5.2 MMOL/L (ref 3.5–5.1)
RBC # BLD AUTO: 4.36 X10(6)UL
SODIUM SERPL-SCNC: 137 MMOL/L (ref 136–145)
WBC # BLD AUTO: 10.1 X10(3) UL (ref 4–11)

## 2025-03-02 PROCEDURE — 85027 COMPLETE CBC AUTOMATED: CPT | Performed by: SURGERY

## 2025-03-02 PROCEDURE — 80048 BASIC METABOLIC PNL TOTAL CA: CPT | Performed by: SURGERY

## 2025-03-02 PROCEDURE — 97162 PT EVAL MOD COMPLEX 30 MIN: CPT

## 2025-03-02 PROCEDURE — 97530 THERAPEUTIC ACTIVITIES: CPT

## 2025-03-02 PROCEDURE — 82962 GLUCOSE BLOOD TEST: CPT

## 2025-03-02 RX ORDER — DIPHENHYDRAMINE HYDROCHLORIDE 50 MG/ML
50 INJECTION INTRAMUSCULAR; INTRAVENOUS EVERY 6 HOURS PRN
Status: DISCONTINUED | OUTPATIENT
Start: 2025-03-02 | End: 2025-03-04

## 2025-03-02 NOTE — PROGRESS NOTES
[facility]  Vascular Surgery Progress Note    Alejandro Mireles Jr. Patient Status:  Inpatient    1975 MRN [id number]   Location [facility] 4W/SW/SE Attending Mary Keller MD   Hosp Day # 7 PCP Fernanda Chapman MD     Objective:   Temp: 98.8 °F (37.1 °C)  Pulse: 81  Resp: 16  BP: 106/65    Intake/Output:     Intake/Output Summary (Last 24 hours) at 3/2/2025 1356  Last data filed at 3/1/2025 2100  Gross per 24 hour   Intake --   Output 120 ml   Net -120 ml       Events Yesterday/Overnight:  Visited the patient at the bedside.  He is s/p revision of the left BKA stump.  Pain is well-controlled.    Exam:  Vitals are within normal limit.  Dressing with no significant shadowing.  Going to leave the dressing in place for now.    Impression/Plan:  Overall doing well.  He is s/p revision of the left BKA stump for osteomyelitis of the distal tibia.  Pain is well-controlled.  Dressing without significant shadowing.  He adamantly the dressing in place and change it on Monday or Tuesday.    Medications:    Current Facility-Administered Medications:     diphenhydrAMINE (Benadryl) 50 mg/mL  injection 50 mg, 50 mg, Intravenous, Q6H PRN    acetaminophen (Tylenol) tab 650 mg, 650 mg, Oral, Q4H PRN **OR** HYDROcodone-acetaminophen (Norco) 5-325 MG per tab 1 tablet, 1 tablet, Oral, Q4H PRN **OR** HYDROcodone-acetaminophen (Norco) 5-325 MG per tab 2 tablet, 2 tablet, Oral, Q4H PRN    ondansetron (Zofran) 4 MG/2ML injection 4 mg, 4 mg, Intravenous, Q6H PRN    prochlorperazine (Compazine) 10 MG/2ML injection 5 mg, 5 mg, Intravenous, Q8H PRN    enoxaparin (Lovenox) 40 MG/0.4ML SUBQ injection 40 mg, 40 mg, Subcutaneous, Daily    HYDROmorphone (Dilaudid) 1 MG/ML injection 0.2 mg, 0.2 mg, Intravenous, Q2H PRN **OR** HYDROmorphone (Dilaudid) 1 MG/ML injection 0.4 mg, 0.4 mg, Intravenous, Q2H PRN **OR** HYDROmorphone (Dilaudid) 1 MG/ML injection 0.8 mg, 0.8 mg, Intravenous, Q2H PRN    HYDROmorphone (Dilaudid) 1  MG/ML injection 1.2 mg, 1.2 mg, Intravenous, Q4H PRN    ceFAZolin (Ancef) 2g in 10mL IV syringe premix, 2 g, Intravenous, Q8H    amLODIPine (Norvasc) tab 5 mg, 5 mg, Oral, Daily    atorvastatin (Lipitor) tab 40 mg, 40 mg, Oral, Nightly    losartan (Cozaar) tab 50 mg, 50 mg, Oral, Daily    multivitamin (Tab-A-Michael/Beta Carotene) tab 1 tablet, 1 tablet, Oral, Daily    insulin aspart (NovoLOG) 100 Units/mL FlexPen 20 Units, 20 Units, Subcutaneous, BID with meals    ondansetron (Zofran) 4 MG/2ML injection 4 mg, 4 mg, Intravenous, Q6H PRN    metoclopramide (Reglan) 5 mg/mL injection 5 mg, 5 mg, Intravenous, TID PRN    insulin aspart (NovoLOG) 100 Units/mL FlexPen 1-5 Units, 1-5 Units, Subcutaneous, TID CC and HS    Laboratory/Data:    LABS:  Recent Labs   Lab 02/24/25  0538 02/27/25  0456 02/28/25  0434 03/02/25  0527   WBC 7.2 7.1 7.8 10.1   HGB 11.4* 11.8* 12.5* 11.5*   MCV 81.9 80.4 80.3 81.7   .0 344.0 357.0 355.0       Recent Labs   Lab 02/24/25  0538 02/26/25  1021 02/27/25  0456 02/28/25  0434 03/02/25  0518    140 140 139 137   K 4.4 4.5 4.7 4.5 5.2*    105 104 104 107   CO2 25.0 27.0 29.0 28.0 24.0   BUN 20 19 24* 23 29*   CREATSERUM 1.88* 1.82* 1.82* 1.90* 3.04*   * 157* 131* 142* 217*   CA 8.2* 8.7 8.6* 8.5* 8.0*         Eduarda Bennett MD  [facility], Division of Vascular Surgery  3/2/2025  1:56 PM

## 2025-03-02 NOTE — PHYSICAL THERAPY NOTE
PHYSICAL THERAPY EVALUATION - INPATIENT     Room Number: 412/412-A  Evaluation Date: 3/2/2025  Type of Evaluation: Initial   Physician Order: PT Eval and Treat    Presenting Problem: admitted for BKA revision 3/1/25 due to osteomyelitis of the distal tibia  Co-Morbidities : DM, L BKA 2019  Reason for Therapy: Mobility Dysfunction and Discharge Planning    PHYSICAL THERAPY ASSESSMENT   Patient is a 49 year old male admitted 2/22/2025 for  BKA revision 3/1/25 due to osteomyelitis of the distal tibia.  Prior to admission, patient's baseline is independently ambulating using LLE BK prosthesis, used a scooter if not wearing L prosthesis. Lives alone in an apartment with 5 steps to enter.  Patient is currently functioning below baseline with bed mobility, transfers, gait, standing prolonged periods, and performing household tasks.  Patient is requiring stand-by assist and contact guard assist as a result of the following impairments: decreased functional strength, pain, and impaired standing balance.  Physical Therapy will continue to follow for duration of hospitalization.    Patient will benefit from continued skilled PT Services at discharge to promote prior level of function.  Anticipate patient will return home with home health PT. Will need mediride assist for stairs.     PLAN DURING HOSPITALIZATION  Nursing Mobility Recommendation :  (2 assist)  PT Device Recommendation: Rolling walker  PT Treatment Plan: Bed mobility;Transfer training;Gait training;Patient education  Rehab Potential : Good  Frequency (Obs): Daily     PHYSICAL THERAPY MEDICAL/SOCIAL HISTORY     Problem List  Principal Problem:    Leg swelling  Active Problems:    Abscess    HOME SITUATION  Type of Home: Apartment  Home Layout: One level  Stairs to Enter : 5             Lives With: Alone    Drives: Yes   Patient Regularly Uses:  (L BK prosthesis, scooter if not wearing L prosthetic leg)       SUBJECTIVE  \"If not for this pain, I will be getting up  and walking by myself \"    PHYSICAL THERAPY EXAMINATION   OBJECTIVE  Precautions: ORQUIDEA tube (LLE)  Fall Risk: High fall risk    WEIGHT BEARING RESTRICTION  L Lower Extremity: Non-Weight Bearing (ORQUIDEA drain and dressings in place)      PAIN ASSESSMENT  Ratin  Location: LLE  Management Techniques: Activity promotion;Repositioning    COGNITION  Overall Cognitive Status:  WFL - within functional limits    RANGE OF MOTION AND STRENGTH ASSESSMENT  Upper extremity ROM and strength are within functional limits   Lower extremity ROM is within functional limits except LLE NT d/t pain  Lower extremity strength is within functional limits except LLE NT d/t pain    BALANCE  Static Sitting: Good  Dynamic Sitting: Fair +  Static Standing: Fair -  Dynamic Standing: Poor +      AM-PAC '6-Clicks' INPATIENT SHORT FORM - BASIC MOBILITY  How much difficulty does the patient currently have...  Patient Difficulty: Turning over in bed (including adjusting bedclothes, sheets and blankets)?: A Little   Patient Difficulty: Sitting down on and standing up from a chair with arms (e.g., wheelchair, bedside commode, etc.): A Little   Patient Difficulty: Moving from lying on back to sitting on the side of the bed?: A Little   How much help from another person does the patient currently need...   Help from Another: Moving to and from a bed to a chair (including a wheelchair)?: A Little   Help from Another: Need to walk in hospital room?: A Little   Help from Another: Climbing 3-5 steps with a railing?: Total     AM-PAC Score:  Raw Score: 16   Approx Degree of Impairment: 54.16%   Standardized Score (AM-PAC Scale): 40.78   CMS Modifier (G-Code): CK    FUNCTIONAL ABILITY STATUS  Functional Mobility/Gait Assessment  Gait Assistance: Contact guard assist  Distance (ft): 2 ft (sidestepped to HOB)  Assistive Device: Rolling walker  Pattern:  (decreased step length)  Rolling: supervision  Supine to Sit: stand-by assist  Sit to Supine: stand-by assist  Sit  to Stand: contact guard assist with RW    Exercise/Education Provided:  Bed mobility  Gait training  Transfer training    Skilled Therapy Provided: pt received in bed. RN approved activity. Pt given pain meds prior to PT session. Pt required encouragement to participate. Initially refusing d/t c/o pain. Pt performed supine to sit with SBA. Refused assistance, refused to have LLE touched or to be assisted in any way. Completed transfer with extra time. Pt refused to wear gait belt. Able to stand with RW, CGA. Sidestepped to HOB x 2 ft CGA.     The patient's Approx Degree of Impairment: 54.16% has been calculated based on documentation in the [facility] '6 clicks' Inpatient Basic Mobility Short Form.  Research supports that patients with this level of impairment may benefit from HHPT.  Final disposition will be made by interdisciplinary medical team.    Patient End of Session: In bed;Needs met;Call light within reach;RN aware of session/findings;All patient questions and concerns addressed;Hospital anti-slip socks    CURRENT GOALS  Goals to be met by: 3/16/25  Patient Goal Patient's self-stated goal is: to return home, be able to walk   Goal #1 Patient is able to demonstrate supine - sit EOB @ level: modified independent     Goal #1   Current Status    Goal #2 Patient is able to demonstrate transfers EOB to/from Chair/Wheelchair at assistance level: modified independent with rolling walker     Goal #2  Current Status    Goal #3 Patient is able to ambulate 50 ft feet with assist device: rolling walker at assistance level: stand by assist   Goal #3   Current Status    Goal #4 Patient will be independent with scooter mobility   Goal #4   Current Status    Goal #5 Patient to demonstrate independence with home activity/exercise instructions provided to patient in preparation for discharge.   Goal #5   Current Status      Patient Evaluation Complexity Level:  History Moderate - 1 or 2 personal factors and/or co-morbidities    Examination of body systems Moderate - addressing a total of 3 or more elements   Clinical Presentation  Moderate - Evolving   Clinical Decision Making  Moderate Complexity       Therapeutic Activity:  25 minutes

## 2025-03-02 NOTE — PLAN OF CARE
Patient is A&Ox4. RA. ACHS. Ancef continued. Carb control diet. Saline locked. Lovenox for dvt prophylaxis. Voiding via urinal. PRN Dilaudid for pain management. Zofran and reglan for nausea overnight. Jeffrey drain intact. Call light within reach, frequent rounding. Safety measures in place. Plan for home when medically clear.     Problem: Patient Centered Care  Goal: Patient preferences are identified and integrated in the patient's plan of care  Description: Interventions:  - What would you like us to know as we care for you?   - Provide timely, complete, and accurate information to patient/family  - Incorporate patient and family knowledge, values, beliefs, and cultural backgrounds into the planning and delivery of care  - Encourage patient/family to participate in care and decision-making at the level they choose  - Honor patient and family perspectives and choices  Outcome: Progressing     Problem: Patient/Family Goals  Goal: Patient/Family Long Term Goal  Description: Patient's Long Term Goal:     Interventions:  -   - See additional Care Plan goals for specific interventions  Outcome: Progressing  Goal: Patient/Family Short Term Goal  Description: Patient's Short Term Goal:     Interventions:     - See additional Care Plan goals for specific interventions  Outcome: Progressing     Problem: PAIN - ADULT  Goal: Verbalizes/displays adequate comfort level or patient's stated pain goal  Description: INTERVENTIONS:  - Encourage pt to monitor pain and request assistance  - Assess pain using appropriate pain scale  - Administer analgesics based on type and severity of pain and evaluate response  - Implement non-pharmacological measures as appropriate and evaluate response  - Consider cultural and social influences on pain and pain management  - Manage/alleviate anxiety  - Utilize distraction and/or relaxation techniques  - Monitor for opioid side effects  - Notify MD/LIP if interventions unsuccessful or patient reports  new pain  - Anticipate increased pain with activity and pre-medicate as appropriate  Outcome: Progressing     Problem: RISK FOR INFECTION - ADULT  Goal: Absence of fever/infection during anticipated neutropenic period  Description: INTERVENTIONS  - Monitor WBC  - Administer growth factors as ordered  - Implement neutropenic guidelines  Outcome: Progressing     Problem: SAFETY ADULT - FALL  Goal: Free from fall injury  Description: INTERVENTIONS:  - Assess pt frequently for physical needs  - Identify cognitive and physical deficits and behaviors that affect risk of falls.  - [place] fall precautions as indicated by assessment.  - Educate pt/family on patient safety including physical limitations  - Instruct pt to call for assistance with activity based on assessment  - Modify environment to reduce risk of injury  - Provide assistive devices as appropriate  - Consider OT/PT consult to assist with strengthening/mobility  - Encourage toileting schedule  Outcome: Progressing     Problem: DISCHARGE PLANNING  Goal: Discharge to home or other facility with appropriate resources  Description: INTERVENTIONS:  - Identify barriers to discharge w/pt and caregiver  - Include patient/family/discharge partner in discharge planning  - Arrange for needed discharge resources and transportation as appropriate  - Identify discharge learning needs (meds, wound care, etc)  - Arrange for interpreters to assist at discharge as needed  - Consider post-discharge preferences of patient/family/discharge partner  - Complete POLST form as appropriate  - Assess patient's ability to be responsible for managing their own health  - Refer to Case Management Department for coordinating discharge planning if the patient needs post-hospital services based on physician/LIP order or complex needs related to functional status, cognitive ability or social support system  Outcome: Progressing     Problem: Diabetes/Glucose Control  Goal: Glucose maintained  within prescribed range  Description: INTERVENTIONS:  - Monitor Blood Glucose as ordered  - Assess for signs and symptoms of hyperglycemia and hypoglycemia  - Administer ordered medications to maintain glucose within target range  - Assess barriers to adequate nutritional intake and initiate nutrition consult as needed  - Instruct patient on self management of diabetes  Outcome: Progressing

## 2025-03-02 NOTE — SPIRITUAL CARE NOTE
Spiritual Care Visit Note    Patient Name: Alejandro Mireles Jr. Date of Spiritual Care Visit: 25   : 1975 Primary Dx: Leg swelling       Referred By: Referral From:     Spiritual Care Taxonomy:    Intended Effects: Demonstrate caring and concern    Methods: Offer emotional support    Interventions: Active listening;Provide hospitality;Explain  role;Share words of hope and inspiration    Visit Type/Summary:     - Spiritual Care: Consulted with RN prior to visit. Offered empathic listening and emotional support. Provided information regarding how to contact Spiritual Care and left a Spiritual Care information card.    remains available as needed for follow up.    Spiritual Care support can be requested via an Epic consult. For urgent/immediate needs, please contact the On Call  at: [place]: ext [phone]    Chaplain Resident, Marian Monterroso, PhD

## 2025-03-02 NOTE — PROGRESS NOTES
[facility]  part of [facility]    Progress Note    Alejandro Mireles Jr. Patient Status:  Inpatient    1975 MRN [id number]   Location [facility] 4W/SW/SE Attending Mary Keller MD   Hosp Day # 7 PCP Fernanda Chapman MD     Chief Complaint: Stump infection    Subjective:     Constitutional: Negative.    HENT: Negative.     Eyes: Negative.    Respiratory: Negative.     Cardiovascular: Negative.    Gastrointestinal: Negative.    Endocrine: Negative.    Genitourinary: Negative.    Musculoskeletal: Negative.    Skin: Negative.    Neurological: Negative.    Hematological: Negative.  Does not bruise/bleed easily.   Psychiatric/Behavioral: Negative.  Negative for agitation.        Objective:   Blood pressure 106/65, pulse 81, temperature 98.8 °F (37.1 °C), temperature source Oral, resp. rate 16, height 6' 3\" (1.905 m), weight 278 lb (126.1 kg), SpO2 98%.  Physical Exam  Vitals and nursing note reviewed.   Constitutional:       Appearance: Normal appearance.   HENT:      Head: Normocephalic and atraumatic.   Eyes:      Pupils: Pupils are equal, round, and reactive to light.   Cardiovascular:      Rate and Rhythm: Normal rate and regular rhythm.   Pulmonary:      Effort: Pulmonary effort is normal.      Breath sounds: Normal breath sounds.   Abdominal:      General: Abdomen is flat.      Palpations: Abdomen is soft.   Musculoskeletal:      Cervical back: Neck supple.   Skin:     Findings: Lesion present.   Neurological:      General: No focal deficit present.      Mental Status: He is alert and oriented to person, place, and time.   Psychiatric:         Mood and Affect: Mood normal.         Results:   Lab Results   Component Value Date    WBC 10.1 2025    HGB 11.5 (L) 2025    HCT 35.6 (L) 2025    .0 2025    CREATSERUM 3.04 (H) 2025    BUN 29 (H) 2025     2025    K 5.2 (H) 2025     2025    CO2 24.0 2025      (H) 03/02/2025    CA 8.0 (L) 03/02/2025       No results found.        Assessment & Plan:         1 Left BKA stump infection on vanco and cefepime  For BKA revision today  Follow cultures   2 HTN BP stable  3 DM monitor acccuheck  4 HLD on statin  Pt c/o itching prn Benadryl  Time spent 35 mins

## 2025-03-03 LAB
ANION GAP SERPL CALC-SCNC: 7 MMOL/L (ref 0–18)
BUN BLD-MCNC: 35 MG/DL (ref 9–23)
BUN/CREAT SERPL: 11.4 (ref 10–20)
CALCIUM BLD-MCNC: 8 MG/DL (ref 8.7–10.4)
CHLORIDE SERPL-SCNC: 104 MMOL/L (ref 98–112)
CO2 SERPL-SCNC: 25 MMOL/L (ref 21–32)
CREAT BLD-MCNC: 3.07 MG/DL
DEPRECATED RDW RBC AUTO: 49.4 FL (ref 35.1–46.3)
EGFRCR SERPLBLD CKD-EPI 2021: 24 ML/MIN/1.73M2 (ref 60–?)
ERYTHROCYTE [DISTWIDTH] IN BLOOD BY AUTOMATED COUNT: 16.7 % (ref 11–15)
GLUCOSE BLD-MCNC: 194 MG/DL (ref 70–99)
GLUCOSE BLDC GLUCOMTR-MCNC: 144 MG/DL (ref 70–99)
GLUCOSE BLDC GLUCOMTR-MCNC: 146 MG/DL (ref 70–99)
GLUCOSE BLDC GLUCOMTR-MCNC: 225 MG/DL (ref 70–99)
GLUCOSE BLDC GLUCOMTR-MCNC: 239 MG/DL (ref 70–99)
HCT VFR BLD AUTO: 34.4 %
HGB BLD-MCNC: 10.7 G/DL
MCH RBC QN AUTO: 25.5 PG (ref 26–34)
MCHC RBC AUTO-ENTMCNC: 31.1 G/DL (ref 31–37)
MCV RBC AUTO: 81.9 FL
OSMOLALITY SERPL CALC.SUM OF ELEC: 295 MOSM/KG (ref 275–295)
PLATELET # BLD AUTO: 361 10(3)UL (ref 150–450)
POTASSIUM SERPL-SCNC: 4.9 MMOL/L (ref 3.5–5.1)
RBC # BLD AUTO: 4.2 X10(6)UL
SODIUM SERPL-SCNC: 136 MMOL/L (ref 136–145)
WBC # BLD AUTO: 9.5 X10(3) UL (ref 4–11)

## 2025-03-03 PROCEDURE — 80048 BASIC METABOLIC PNL TOTAL CA: CPT | Performed by: INTERNAL MEDICINE

## 2025-03-03 PROCEDURE — 82550 ASSAY OF CK (CPK): CPT | Performed by: PHYSICIAN ASSISTANT

## 2025-03-03 PROCEDURE — 82962 GLUCOSE BLOOD TEST: CPT

## 2025-03-03 PROCEDURE — 85027 COMPLETE CBC AUTOMATED: CPT | Performed by: INTERNAL MEDICINE

## 2025-03-03 RX ORDER — VANCOMYCIN HYDROCHLORIDE
1500 EVERY 24 HOURS
Status: DISCONTINUED | OUTPATIENT
Start: 2025-03-03 | End: 2025-03-04

## 2025-03-03 NOTE — PLAN OF CARE
POD:2 Patient is A&Ox4. RA. ACHS. Ancef continued. Carb control diet. Saline locked. Lovenox for dvt prophylaxis. Voiding via urinal. PRN Dilaudid for pain management. Jeffrey drain intact. Call light within reach, frequent rounding. Safety measures in place. Plan for home when medically clear.       Problem: Patient Centered Care  Goal: Patient preferences are identified and integrated in the patient's plan of care  Description: Interventions:  - What would you like us to know as we care for you?   - Provide timely, complete, and accurate information to patient/family  - Incorporate patient and family knowledge, values, beliefs, and cultural backgrounds into the planning and delivery of care  - Encourage patient/family to participate in care and decision-making at the level they choose  - Honor patient and family perspectives and choices  Outcome: Progressing     Problem: Patient/Family Goals  Goal: Patient/Family Long Term Goal  Description: Patient's Long Term Goal:     Interventions:  -   - See additional Care Plan goals for specific interventions  Outcome: Progressing  Goal: Patient/Family Short Term Goal  Description: Patient's Short Term Goal:     Interventions:     - See additional Care Plan goals for specific interventions  Outcome: Progressing     Problem: PAIN - ADULT  Goal: Verbalizes/displays adequate comfort level or patient's stated pain goal  Description: INTERVENTIONS:  - Encourage pt to monitor pain and request assistance  - Assess pain using appropriate pain scale  - Administer analgesics based on type and severity of pain and evaluate response  - Implement non-pharmacological measures as appropriate and evaluate response  - Consider cultural and social influences on pain and pain management  - Manage/alleviate anxiety  - Utilize distraction and/or relaxation techniques  - Monitor for opioid side effects  - Notify MD/LIP if interventions unsuccessful or patient reports new pain  - Anticipate increased  pain with activity and pre-medicate as appropriate  Outcome: Progressing     Problem: RISK FOR INFECTION - ADULT  Goal: Absence of fever/infection during anticipated neutropenic period  Description: INTERVENTIONS  - Monitor WBC  - Administer growth factors as ordered  - Implement neutropenic guidelines  Outcome: Progressing     Problem: SAFETY ADULT - FALL  Goal: Free from fall injury  Description: INTERVENTIONS:  - Assess pt frequently for physical needs  - Identify cognitive and physical deficits and behaviors that affect risk of falls.  - [place] fall precautions as indicated by assessment.  - Educate pt/family on patient safety including physical limitations  - Instruct pt to call for assistance with activity based on assessment  - Modify environment to reduce risk of injury  - Provide assistive devices as appropriate  - Consider OT/PT consult to assist with strengthening/mobility  - Encourage toileting schedule  Outcome: Progressing     Problem: DISCHARGE PLANNING  Goal: Discharge to home or other facility with appropriate resources  Description: INTERVENTIONS:  - Identify barriers to discharge w/pt and caregiver  - Include patient/family/discharge partner in discharge planning  - Arrange for needed discharge resources and transportation as appropriate  - Identify discharge learning needs (meds, wound care, etc)  - Arrange for interpreters to assist at discharge as needed  - Consider post-discharge preferences of patient/family/discharge partner  - Complete POLST form as appropriate  - Assess patient's ability to be responsible for managing their own health  - Refer to Case Management Department for coordinating discharge planning if the patient needs post-hospital services based on physician/LIP order or complex needs related to functional status, cognitive ability or social support system  Outcome: Progressing     Problem: Diabetes/Glucose Control  Goal: Glucose maintained within prescribed  range  Description: INTERVENTIONS:  - Monitor Blood Glucose as ordered  - Assess for signs and symptoms of hyperglycemia and hypoglycemia  - Administer ordered medications to maintain glucose within target range  - Assess barriers to adequate nutritional intake and initiate nutrition consult as needed  - Instruct patient on self management of diabetes  Outcome: Progressing

## 2025-03-03 NOTE — CM/SW NOTE
[facility] followed up on DC planning.     [facility] was notified by PA that pt hx with PICC lines they were hoping for a 1x weekly IV Abx.     With pts insurance [facility] does not accept medicaid. [facility] called spoke with Norma from [facility] who confirmed pt's [employer] Medicaid is OON with [facility] and they cannot schedule outpatient appointments with this insurance    Above notified to Suzanne SANTANA LSW, MSW ext. [phone]

## 2025-03-03 NOTE — PROGRESS NOTES
[facility]  part of [facility]    Progress Note    Alejandro Mireles Jr. Patient Status:  Inpatient    1975 MRN [id number]   Location [facility] 4W/SW/SE Attending Mary Keller MD   Hosp Day # 8 PCP Fernanda Chapman MD     Chief Complaint: Stump infection    Subjective:     Constitutional: Negative.    HENT: Negative.     Eyes: Negative.    Respiratory: Negative.     Cardiovascular: Negative.    Gastrointestinal: Negative.    Endocrine: Negative.    Genitourinary: Negative.    Musculoskeletal: Negative.    Skin: Negative.    Neurological: Negative.    Hematological: Negative.  Does not bruise/bleed easily.   Psychiatric/Behavioral: Negative.  Negative for agitation.        Objective:   Blood pressure 120/58, pulse 89, temperature 98.6 °F (37 °C), temperature source Oral, resp. rate 18, height 6' 3\" (1.905 m), weight 278 lb (126.1 kg), SpO2 99%.  Physical Exam  Vitals and nursing note reviewed.   Constitutional:       Appearance: Normal appearance.   HENT:      Head: Normocephalic and atraumatic.   Eyes:      Pupils: Pupils are equal, round, and reactive to light.   Cardiovascular:      Rate and Rhythm: Normal rate and regular rhythm.   Pulmonary:      Effort: Pulmonary effort is normal.      Breath sounds: Normal breath sounds.   Abdominal:      General: Abdomen is flat.      Palpations: Abdomen is soft.   Musculoskeletal:      Cervical back: Neck supple.   Skin:     Findings: Lesion present.   Neurological:      General: No focal deficit present.      Mental Status: He is alert and oriented to person, place, and time.   Psychiatric:         Mood and Affect: Mood normal.         Results:   Lab Results   Component Value Date    WBC 9.5 2025    HGB 10.7 (L) 2025    HCT 34.4 (L) 2025    .0 2025    CREATSERUM 3.07 (H) 2025    BUN 35 (H) 2025     2025    K 4.9 2025     2025    CO2 25.0 2025    GLU  194 (H) 03/03/2025    CA 8.0 (L) 03/03/2025       No results found.        Assessment & Plan:         1 Left BKA stump infection on vanco and cefepime  S/p revision of stump, continue IV abx  2 HTN BP stable  3 DM monitor acccuheck  4 HLD on statin  Time spent 35 mins

## 2025-03-03 NOTE — PROGRESS NOTES
[facility] Pharmacy Dosing Service      Initial Pharmacokinetic Consult for Vancomycin Dosing     Alejandro Mireles Jr. is a 49 year old male who is being initiated on vancomycin therapy for cellulitis.  Pharmacy has been asked to dose vancomycin by TRACY Calderón.  The initial treatment and monitoring approach will be non-AUC strategy.        Weight and Temperature:    Wt Readings from Last 1 Encounters:   25 126.1 kg (278 lb)        Temp Readings from Last 1 Encounters:   25 98.6 °F (37 °C) (Oral)      Labs:   Recent Labs   Lab 25  0434 25  0518 25  0522   CREATSERUM 1.90* 3.04* 3.07*      Estimated Creatinine Clearance: 34.4 mL/min (A) (based on SCr of 3.07 mg/dL (H)).     Recent Labs   Lab 254 25  0527 25  0522   WBC 7.8 10.1 9.5          The Pharmacokinetic Target is:    Trough/random 10-15 mg/L    Renal Dosing Considerations:    YOLETTE/ARF     Assessment/Plan:       Maintenance dose: Pharmacy will dose vancomycin at 1500 mg IV every 24 hours    Monitorin) Plan for vancomycin trough to be obtained before the 3rd dose    2) Pharmacy will order SCr as clinically indicated to assess renal function.    3) Pharmacy will monitor for toxicity and efficacy, adjust vancomycin dose and/or frequency, and order vancomycin levels as appropriate per the Pharmacy and Therapeutics Committee approved protocol until discontinuation of the medication.       We appreciate the opportunity to assist in the care of this patient.     Sharonda Lance PharmD  3/3/2025  8:56 AM  [facility]  Pharmacy Extension: [phone]

## 2025-03-03 NOTE — PLAN OF CARE
Problem: PAIN - ADULT  Goal: Verbalizes/displays adequate comfort level or patient's stated pain goal  Description: INTERVENTIONS:  - Encourage pt to monitor pain and request assistance  - Assess pain using appropriate pain scale  - Administer analgesics based on type and severity of pain and evaluate response  - Implement non-pharmacological measures as appropriate and evaluate response  - Consider cultural and social influences on pain and pain management  - Manage/alleviate anxiety  - Utilize distraction and/or relaxation techniques  - Monitor for opioid side effects  - Notify MD/LIP if interventions unsuccessful or patient reports new pain  - Anticipate increased pain with activity and pre-medicate as appropriate  Outcome: Progressing     Problem: RISK FOR INFECTION - ADULT  Goal: Absence of fever/infection during anticipated neutropenic period  Description: INTERVENTIONS  - Monitor WBC  - Administer growth factors as ordered  - Implement neutropenic guidelines  Outcome: Progressing     Problem: SAFETY ADULT - FALL  Goal: Free from fall injury  Description: INTERVENTIONS:  - Assess pt frequently for physical needs  - Identify cognitive and physical deficits and behaviors that affect risk of falls.  - [place] fall precautions as indicated by assessment.  - Educate pt/family on patient safety including physical limitations  - Instruct pt to call for assistance with activity based on assessment  - Modify environment to reduce risk of injury  - Provide assistive devices as appropriate  - Consider OT/PT consult to assist with strengthening/mobility  - Encourage toileting schedule  Outcome: Progressing     Problem: DISCHARGE PLANNING  Goal: Discharge to home or other facility with appropriate resources  Description: INTERVENTIONS:  - Identify barriers to discharge w/pt and caregiver  - Include patient/family/discharge partner in discharge planning  - Arrange for needed discharge resources and transportation as  appropriate  - Identify discharge learning needs (meds, wound care, etc)  - Arrange for interpreters to assist at discharge as needed  - Consider post-discharge preferences of patient/family/discharge partner  - Complete POLST form as appropriate  - Assess patient's ability to be responsible for managing their own health  - Refer to Case Management Department for coordinating discharge planning if the patient needs post-hospital services based on physician/LIP order or complex needs related to functional status, cognitive ability or social support system  Outcome: Progressing     Problem: Diabetes/Glucose Control  Goal: Glucose maintained within prescribed range  Description: INTERVENTIONS:  - Monitor Blood Glucose as ordered  - Assess for signs and symptoms of hyperglycemia and hypoglycemia  - Administer ordered medications to maintain glucose within target range  - Assess barriers to adequate nutritional intake and initiate nutrition consult as needed  - Instruct patient on self management of diabetes  Outcome: Progressing

## 2025-03-03 NOTE — PROGRESS NOTES
INFECTIOUS DISEASE PROGRESS NOTE  [facility]  part of [facility] ID PROGRESS NOTE    Alejandro Mireles Jr. Patient Status:  Inpatient    1975 MRN [id number]   Location [facility] 4W/SW/SE Attending Mary Keller MD   Hosp Day # 8 PCP Fernanda Chapman MD     Subjective:  ROS reviewed. Pain controlled.    ASSESSMENT:    Antibiotics: Vancomycin  Ancef, Cefepime     # L BKA stump abscess with concerns for chronic OM with sinus tract s/p OR 3/1 for revision, cx MRSA               -  MRI  with distal tibia OM involving osteotomy margin + abscess (1.9 x 1.8cm) w/ sinus tract to skin, s/p bedside aspiration, cx ngtd; drainage cx- S.agalactiae, diptheroids               -  Refused IV abx given hx PICC assc DVT, s/p augmentin through                - Seen again on doppler US  (4.8 x 5.3 x 4.5cm)   -S/p IR aspiration , cx GBS   -Vascular surgery planning revision 3/1  # Hx R TMA abscess 2024- s/p I&D, ertapenem, dc 24  # Hx BLE OM (s/p L BKA , R TMA )  # DM, CKD  # HTN      PLAN:  -  Stop ancef and start vancomycin. Anticipate four week course of IV abx on DC.  -  Local wound care.   -  Follow fever curve, wbc.  -  Reviewed labs, micro, imaging reports, available old records.  -  Case d/w patient, RN.     History of Present Illness:  49 year old male with PMH HTN, DM w/neuropathy/nephropathy, PAD, LLE OM (s/p L BKA ), R forefoot OM (s/p R TMA ), c/b R TMA OM/abscess (s/p I&D 24, ertapenem (EOT 24)c/b PICC assc RUE DVT))     Pt admitted at [facility] in 2024 with L BKA stump OM and abscess s/p bedside aspiration, cx ngtd. Admit drainage cx w/S.agalactiae and diptheroids. Was on zosyn. Pt was unwilling to undergo another course of iv abx d/t hx DVT and thus DC on augmentin through 25. Now presents to [facility] ER  with L stump swelling. He believes that these recurrent infections keep happening because of excessive sweating  while wearing his prosthetic. There was also a scab on his stump that he recently picked off. Endorses stump associated pain and swelling but reported drainage. No fevers or chills. Pt does not want any invasive surgical procedures or iv antibiotics. On admit, no fever. WBC 9.9. LLE dopplers neg for DVT but with abscess (4.8 x 5.3 x 4.5 cm). Bcx sent. Pt given 1x dose vancomycin + zosyn. ID consulted.     Physical Exam:  /58 (BP Location: Right arm)   Pulse 89   Temp 98.6 °F (37 °C) (Oral)   Resp 18   Ht 6' 3\" (1.905 m)   Wt 278 lb (126.1 kg)   SpO2 99%   BMI 34.75 kg/m²     Gen:   Awake, in bed  HEENT:  EOMI, neck supple  CV/lungs:  RRR, lungs clear in all fields  Abdom:  Soft, no TTP  Skin/extrem:  L BKA stump with post-op dressing with ORQUIDEA drain  Lines:  PIV+    Laboratory Data: Reviewed    Microbiology: Reviewed    Radiology: Reviewed      SUS[facility]  [phone]  3/3/2025

## 2025-03-03 NOTE — PHYSICAL THERAPY NOTE
Chart reviewed and attempted treatment pt declined at this time. Pt reported that he does not need anything from therapy and is moving fine. Will keep on caseload and check back tomorrow if pt's needs change. RN aware.

## 2025-03-03 NOTE — PROGRESS NOTES
[facility]  part of [facility]     Vascular Surgery Progress Note    Alejandro Mireles . Patient Status:  Inpatient    1975 MRN [id number]   Location [facility] 4W/SW/SE Attending Mary Keller MD   Hosp Day # 8 PCP Fernanda Chapman MD     Objective:   Temp: 98.6 °F (37 °C)  Pulse: 89  Resp: 18  BP: 120/58    Intake/Output:     Intake/Output Summary (Last 24 hours) at 3/3/2025 0933  Last data filed at 3/3/2025 0744  Gross per 24 hour   Intake --   Output 705 ml   Net -705 ml         Events Yesterday/Overnight:  S/p revision of the left below-knee amputation with re-amputation of the tibia 3/01/25.  Pain is well controlled.   Tissue aerobic culture grew 1+ Staph aureus MRSA.  He is on IV Vancomycin.     Exam:  Dressing from left below-knee amputation stump was removed.   Surgical incision is dry with no purulence or oozing noted.   Sutures in place.  ORQUIDEA drain with small amount of serosanguineous discharge.     Impression/Plan:  ORQUIDEA drain was removed.   New dressing was applied to the left below-knee amputation stump.   Stump must remain elevated on 1 pillow as much as possible.   Continue IV antibiotic therapy.   Continue Lovenox subcutaneous for DVT prophylaxis.    Might be able to discharge tomorrow if continues progressing well.       Medications:  Current Facility-Administered Medications   Medication Dose Route Frequency    vancomycin (Vancocin) 1.5 g in sodium chloride 0.9% 250mL IVPB premix  1,500 mg Intravenous Q24H    diphenhydrAMINE (Benadryl) 50 mg/mL  injection 50 mg  50 mg Intravenous Q6H PRN    acetaminophen (Tylenol) tab 650 mg  650 mg Oral Q4H PRN    Or    HYDROcodone-acetaminophen (Norco) 5-325 MG per tab 1 tablet  1 tablet Oral Q4H PRN    Or    HYDROcodone-acetaminophen (Norco) 5-325 MG per tab 2 tablet  2 tablet Oral Q4H PRN    ondansetron (Zofran) 4 MG/2ML injection 4 mg  4 mg Intravenous Q6H PRN    prochlorperazine (Compazine) 10 MG/2ML injection 5 mg  5  mg Intravenous Q8H PRN    enoxaparin (Lovenox) 40 MG/0.4ML SUBQ injection 40 mg  40 mg Subcutaneous Daily    HYDROmorphone (Dilaudid) 1 MG/ML injection 0.2 mg  0.2 mg Intravenous Q2H PRN    Or    HYDROmorphone (Dilaudid) 1 MG/ML injection 0.4 mg  0.4 mg Intravenous Q2H PRN    Or    HYDROmorphone (Dilaudid) 1 MG/ML injection 0.8 mg  0.8 mg Intravenous Q2H PRN    HYDROmorphone (Dilaudid) 1 MG/ML injection 1.2 mg  1.2 mg Intravenous Q4H PRN    amLODIPine (Norvasc) tab 5 mg  5 mg Oral Daily    atorvastatin (Lipitor) tab 40 mg  40 mg Oral Nightly    losartan (Cozaar) tab 50 mg  50 mg Oral Daily    multivitamin (Tab-A-Michael/Beta Carotene) tab 1 tablet  1 tablet Oral Daily    insulin aspart (NovoLOG) 100 Units/mL FlexPen 20 Units  20 Units Subcutaneous BID with meals    ondansetron (Zofran) 4 MG/2ML injection 4 mg  4 mg Intravenous Q6H PRN    metoclopramide (Reglan) 5 mg/mL injection 5 mg  5 mg Intravenous TID PRN    insulin aspart (NovoLOG) 100 Units/mL FlexPen 1-5 Units  1-5 Units Subcutaneous TID CC and HS       Laboratory/Data:    LABS:  Recent Labs   Lab 02/27/25  0456 02/28/25  0434 03/02/25  0527 03/03/25  0522   WBC 7.1 7.8 10.1 9.5   HGB 11.8* 12.5* 11.5* 10.7*   MCV 80.4 80.3 81.7 81.9   .0 357.0 355.0 361.0       Recent Labs   Lab 02/26/25  1021 02/27/25  0456 02/28/25  0434 03/02/25  0518 03/03/25  0522    140 139 137 136   K 4.5 4.7 4.5 5.2* 4.9    104 104 107 104   CO2 27.0 29.0 28.0 24.0 25.0   BUN 19 24* 23 29* 35*   CREATSERUM 1.82* 1.82* 1.90* 3.04* 3.07*   * 131* 142* 217* 194*   CA 8.7 8.6* 8.5* 8.0* 8.0*     No results for input(s): \"PTP\", \"INR\", \"PTT\" in the last 168 hours.  No results for input(s): \"ALT\", \"AST\", \"ALB\", \"AMYLASE\", \"LIPASE\", \"LDH\" in the last 168 hours.    Invalid input(s): \"ALPHOS\", \"TBIL\", \"DBIL\", \"TPROT\"  No results for input(s): \"TROP\" in the last 168 hours.  No results found for: \"ANAS\", \"HORACIO\", \"ANASCRN\"  No results for input(s): \"PCACT\", \"PSACT\",  \"AT3ACT\", \"HIPAB\", \"PATHI\", \"STALA\", \"DRVVTRATIO\", \"DRVVT\", \"STACLOT\", \"CARIGG\", \"E2FG3FLPTP\", \"Z8RI3IAMYP\", \"RA\", \"HAVIGM\", \"HBCIGM\", \"HCVAB\", \"HBSAG\", \"HBCAB\", \"HBVDNAINTERP\", \"ANAS\", \"C3\", \"C4\" in the last 168 hours.    KATHERINE Escalante  Division of Vascular Surgery.

## 2025-03-04 ENCOUNTER — APPOINTMENT (OUTPATIENT)
Dept: PICC SERVICES | Facility: HOSPITAL | Age: 50
End: 2025-03-04
Attending: PHYSICIAN ASSISTANT
Payer: MEDICARE

## 2025-03-04 VITALS
WEIGHT: 278 LBS | SYSTOLIC BLOOD PRESSURE: 129 MMHG | BODY MASS INDEX: 34.57 KG/M2 | HEIGHT: 75 IN | RESPIRATION RATE: 18 BRPM | OXYGEN SATURATION: 98 % | DIASTOLIC BLOOD PRESSURE: 84 MMHG | TEMPERATURE: 98 F | HEART RATE: 94 BPM

## 2025-03-04 LAB
CK SERPL-CCNC: 357 U/L
GLUCOSE BLDC GLUCOMTR-MCNC: 147 MG/DL (ref 70–99)
GLUCOSE BLDC GLUCOMTR-MCNC: 159 MG/DL (ref 70–99)
GLUCOSE BLDC GLUCOMTR-MCNC: 73 MG/DL (ref 70–99)

## 2025-03-04 PROCEDURE — 36569 INSJ PICC 5 YR+ W/O IMAGING: CPT

## 2025-03-04 PROCEDURE — 02HV33Z INSERTION OF INFUSION DEVICE INTO SUPERIOR VENA CAVA, PERCUTANEOUS APPROACH: ICD-10-PCS | Performed by: INTERNAL MEDICINE

## 2025-03-04 PROCEDURE — 82962 GLUCOSE BLOOD TEST: CPT

## 2025-03-04 PROCEDURE — 97530 THERAPEUTIC ACTIVITIES: CPT

## 2025-03-04 RX ORDER — HYDROCODONE BITARTRATE AND ACETAMINOPHEN 5; 325 MG/1; MG/1
1 TABLET ORAL EVERY 4 HOURS PRN
Qty: 20 TABLET | Refills: 0 | Status: SHIPPED | OUTPATIENT
Start: 2025-03-04

## 2025-03-04 RX ORDER — LIDOCAINE HYDROCHLORIDE 10 MG/ML
5 INJECTION, SOLUTION EPIDURAL; INFILTRATION; INTRACAUDAL; PERINEURAL
Status: COMPLETED | OUTPATIENT
Start: 2025-03-04 | End: 2025-03-04

## 2025-03-04 NOTE — CM/SW NOTE
Sw met with Pt to discuss IV ABX options. Pt has Hx of using [facility] and [facility] and is agreeable to use these providers again. Sw sent referrals in [place] to above agencies. Sw informed Pt of weekly co-pay of $1.60 a week. Pt is agreeable. Sw will attach PICC information once completed. Both [facility] and [facility] are ready to service Pt upon DC.     [facility]  [address]  [place] [address]  Phone: [phone]  Fax: [phone]      [facility]:  [address] [place] [address]  Phone: [phone]  Fax: [phone]    4:15  [facility] Both [facility] aware of Pt DC tonight and starting care tomorrow. [facility] confirmed delivery of ABX tonight after 5PM.      Plan: DC home with [facility] for IV ABX    SW to remain available for support and/or discharge planning.    Rosanna Ambrosio MSW, LSW   [id number]

## 2025-03-04 NOTE — PLAN OF CARE
Patient POD 3 . A&O x 4. On RA. Lovenox SQ for DVT prophylaxis. Dressing c/d/I. LLE elevated with pillows. IV dilaudid given for pain control. Prn zofran given one time overnight for nausea and vomiting. Saline locked. Voiding via urinal. Bed locked and to the lowest position. Bed alarm on. Call light within reach. Fall precautions in place. Plan pending.    Problem: Patient Centered Care  Goal: Patient preferences are identified and integrated in the patient's plan of care  Description: Interventions:  - What would you like us to know as we care for you?   - Provide timely, complete, and accurate information to patient/family  - Incorporate patient and family knowledge, values, beliefs, and cultural backgrounds into the planning and delivery of care  - Encourage patient/family to participate in care and decision-making at the level they choose  - Honor patient and family perspectives and choices  Outcome: Progressing     Problem: Patient/Family Goals  Goal: Patient/Family Long Term Goal  Description: Patient's Long Term Goal:     Interventions:  -   - See additional Care Plan goals for specific interventions  Outcome: Progressing  Goal: Patient/Family Short Term Goal  Description: Patient's Short Term Goal:     Interventions:   -   - See additional Care Plan goals for specific interventions  Outcome: Progressing     Problem: PAIN - ADULT  Goal: Verbalizes/displays adequate comfort level or patient's stated pain goal  Description: INTERVENTIONS:  - Encourage pt to monitor pain and request assistance  - Assess pain using appropriate pain scale  - Administer analgesics based on type and severity of pain and evaluate response  - Implement non-pharmacological measures as appropriate and evaluate response  - Consider cultural and social influences on pain and pain management  - Manage/alleviate anxiety  - Utilize distraction and/or relaxation techniques  - Monitor for opioid side effects  - Notify MD/LIP if interventions  unsuccessful or patient reports new pain  - Anticipate increased pain with activity and pre-medicate as appropriate  Outcome: Progressing     Problem: RISK FOR INFECTION - ADULT  Goal: Absence of fever/infection during anticipated neutropenic period  Description: INTERVENTIONS  - Monitor WBC  - Administer growth factors as ordered  - Implement neutropenic guidelines  Outcome: Progressing     Problem: SAFETY ADULT - FALL  Goal: Free from fall injury  Description: INTERVENTIONS:  - Assess pt frequently for physical needs  - Identify cognitive and physical deficits and behaviors that affect risk of falls.  - [place] fall precautions as indicated by assessment.  - Educate pt/family on patient safety including physical limitations  - Instruct pt to call for assistance with activity based on assessment  - Modify environment to reduce risk of injury  - Provide assistive devices as appropriate  - Consider OT/PT consult to assist with strengthening/mobility  - Encourage toileting schedule  Outcome: Progressing     Problem: DISCHARGE PLANNING  Goal: Discharge to home or other facility with appropriate resources  Description: INTERVENTIONS:  - Identify barriers to discharge w/pt and caregiver  - Include patient/family/discharge partner in discharge planning  - Arrange for needed discharge resources and transportation as appropriate  - Identify discharge learning needs (meds, wound care, etc)  - Arrange for interpreters to assist at discharge as needed  - Consider post-discharge preferences of patient/family/discharge partner  - Complete POLST form as appropriate  - Assess patient's ability to be responsible for managing their own health  - Refer to Case Management Department for coordinating discharge planning if the patient needs post-hospital services based on physician/LIP order or complex needs related to functional status, cognitive ability or social support system  Outcome: Progressing     Problem: Diabetes/Glucose  Control  Goal: Glucose maintained within prescribed range  Description: INTERVENTIONS:  - Monitor Blood Glucose as ordered  - Assess for signs and symptoms of hyperglycemia and hypoglycemia  - Administer ordered medications to maintain glucose within target range  - Assess barriers to adequate nutritional intake and initiate nutrition consult as needed  - Instruct patient on self management of diabetes  Outcome: Progressing

## 2025-03-04 NOTE — PLAN OF CARE
Alejandro is from home with family. Alert and oriented x 4. Contact isolation (+) MRSA culture LLE, hx BKA LLE-pod 3 debridement-drsg changed 3/3 per surgeon and ORQUIDEA removed, ac/hs glucose monitoring, lbm 3/1, voiding via urinal ,  norco/dilaudid prn pain, elevation of LLE, worked with  PT today, afebrile-ID on the case-iv vanco-now d/c'ed-see emar daptomycin-1st dose given  here in hospital. , [facility] has coordinated hhc and dleivery of iv abx to begin tomorrow 3/5-patient is aware of dc plan and f/u-please refer to dc instructions. Problem: Patient Centered Care  Goal: Patient preferences are identified and integrated in the patient's plan of care  Description: Interventions:  - What would you like us to know as we care for you? Lives with girlfriend  - Provide timely, complete, and accurate information to patient/family  - Incorporate patient and family knowledge, values, beliefs, and cultural backgrounds into the planning and delivery of care  - Encourage patient/family to participate in care and decision-making at the level they choose  - Honor patient and family perspectives and choices  Outcome: Adequate for Discharge     Problem: Patient/Family Goals  Goal: Patient/Family Long Term Goal  Description: Patient's Long Term Goal: free of infection    Interventions:  - PICC line  Iv abx  Vs wbl  F/u with ID   - See additional Care Plan goals for specific interventions  Outcome: Adequate for Discharge  Goal: Patient/Family Short Term Goal  Description: Patient's Short Term Goal: home today    Interventions:   - vs/labs wnl  Cleared by therapy  Picc line placed  F/u care coordinated with SW  - See additional Care Plan goals for specific interventions  Outcome: Adequate for Discharge     Problem: PAIN - ADULT  Goal: Verbalizes/displays adequate comfort level or patient's stated pain goal  Description: INTERVENTIONS:  - Encourage pt to monitor pain and request assistance  - Assess pain using appropriate pain scale  -  Administer analgesics based on type and severity of pain and evaluate response  - Implement non-pharmacological measures as appropriate and evaluate response  - Consider cultural and social influences on pain and pain management  - Manage/alleviate anxiety  - Utilize distraction and/or relaxation techniques  - Monitor for opioid side effects  - Notify MD/LIP if interventions unsuccessful or patient reports new pain  - Anticipate increased pain with activity and pre-medicate as appropriate  Outcome: Adequate for Discharge     Problem: RISK FOR INFECTION - ADULT  Goal: Absence of fever/infection during anticipated neutropenic period  Description: INTERVENTIONS  - Monitor WBC  - Administer growth factors as ordered  - Implement neutropenic guidelines  Outcome: Adequate for Discharge     Problem: SAFETY ADULT - FALL  Goal: Free from fall injury  Description: INTERVENTIONS:  - Assess pt frequently for physical needs  - Identify cognitive and physical deficits and behaviors that affect risk of falls.  - [place] fall precautions as indicated by assessment.  - Educate pt/family on patient safety including physical limitations  - Instruct pt to call for assistance with activity based on assessment  - Modify environment to reduce risk of injury  - Provide assistive devices as appropriate  - Consider OT/PT consult to assist with strengthening/mobility  - Encourage toileting schedule  Outcome: Adequate for Discharge     Problem: DISCHARGE PLANNING  Goal: Discharge to home or other facility with appropriate resources  Description: INTERVENTIONS:  - Identify barriers to discharge w/pt and caregiver  - Include patient/family/discharge partner in discharge planning  - Arrange for needed discharge resources and transportation as appropriate  - Identify discharge learning needs (meds, wound care, etc)  - Arrange for interpreters to assist at discharge as needed  - Consider post-discharge preferences of patient/family/discharge  partner  - Complete POLST form as appropriate  - Assess patient's ability to be responsible for managing their own health  - Refer to Case Management Department for coordinating discharge planning if the patient needs post-hospital services based on physician/LIP order or complex needs related to functional status, cognitive ability or social support system  Outcome: Adequate for Discharge     Problem: Diabetes/Glucose Control  Goal: Glucose maintained within prescribed range  Description: INTERVENTIONS:  - Monitor Blood Glucose as ordered  - Assess for signs and symptoms of hyperglycemia and hypoglycemia  - Administer ordered medications to maintain glucose within target range  - Assess barriers to adequate nutritional intake and initiate nutrition consult as needed  - Instruct patient on self management of diabetes  Outcome: Adequate for Discharge

## 2025-03-04 NOTE — DISCHARGE INSTRUCTIONS
Please make and keep follow up appointments as instructed, call sooner with any questions or concerns  Keep left leg elevated when in bed, and monitor for any signs/symptoms of infection-see handout  Keep dressing to left leg clean and dry, please check with surgeon regarding dressing changes or when you can get the site wet  Take pain medication as directed, call surgeon if your pain is not controlled with current prescription  Continue to follow a carb controlled diet and monitor blood sugars to optimize your healing  See handout regarding your PICC line and management        [facility]:  [address] [place] [address]  Phone: [phone]  Fax: [phone]    [address]  [place] [address]  Phone: [phone]  Fax: [phone]

## 2025-03-04 NOTE — PLAN OF CARE
Alejandro is from home with family. Alert and oriented x 4. Contact isolation (+) MRSA culture LLE, hx BKA LLE-pod 2 debridement-drsg changed today per surgeon and ORQUIDEA removed, ac/hs glucose monitoring-paient is non compliant with insulin administration at times and displays lack of knowledge-re educate as able, lbm 3/1, voiding via urinal , declined po pain medication , states \"does nothing for me\", iv dilaudid prn and elevation of LLE, declined PT today, afebrile-ID on the case-iv vanco, SW to coordinate regarding iv abx post hospitalization, lovenox, also hx all toes amputated from RLE, tentatively home with hhc pending iv abx setup per case management  Problem: Patient Centered Care  Goal: Patient preferences are identified and integrated in the patient's plan of care  Description: Interventions:  - What would you like us to know as we care for you? I do not want a picc line  - Provide timely, complete, and accurate information to patient/family  - Incorporate patient and family knowledge, values, beliefs, and cultural backgrounds into the planning and delivery of care  - Encourage patient/family to participate in care and decision-making at the level they choose  - Honor patient and family perspectives and choices  Outcome: Progressing     Problem: Patient/Family Goals  Goal: Patient/Family Long Term Goal  Description: Patient's Long Term Goal: return to baseline adls    Interventions:  - pt  Pain control  Use of rec equip  - See additional Care Plan goals for specific interventions  Outcome: Progressing  Goal: Patient/Family Short Term Goal  Description: Patient's Short Term Goal: home this week    Interventions:   - id f/u c and final abx  Case management to help facilitate home vs outpatient iv ab x therapy  - See additional Care Plan goals for specific interventions  Outcome: Progressing     Problem: PAIN - ADULT  Goal: Verbalizes/displays adequate comfort level or patient's stated pain goal  Description:  INTERVENTIONS:  - Encourage pt to monitor pain and request assistance  - Assess pain using appropriate pain scale  - Administer analgesics based on type and severity of pain and evaluate response  - Implement non-pharmacological measures as appropriate and evaluate response  - Consider cultural and social influences on pain and pain management  - Manage/alleviate anxiety  - Utilize distraction and/or relaxation techniques  - Monitor for opioid side effects  - Notify MD/LIP if interventions unsuccessful or patient reports new pain  - Anticipate increased pain with activity and pre-medicate as appropriate  Outcome: Progressing     Problem: RISK FOR INFECTION - ADULT  Goal: Absence of fever/infection during anticipated neutropenic period  Description: INTERVENTIONS  - Monitor WBC  - Administer growth factors as ordered  - Implement neutropenic guidelines  Outcome: Progressing     Problem: SAFETY ADULT - FALL  Goal: Free from fall injury  Description: INTERVENTIONS:  - Assess pt frequently for physical needs  - Identify cognitive and physical deficits and behaviors that affect risk of falls.  - [place] fall precautions as indicated by assessment.  - Educate pt/family on patient safety including physical limitations  - Instruct pt to call for assistance with activity based on assessment  - Modify environment to reduce risk of injury  - Provide assistive devices as appropriate  - Consider OT/PT consult to assist with strengthening/mobility  - Encourage toileting schedule  Outcome: Progressing     Problem: DISCHARGE PLANNING  Goal: Discharge to home or other facility with appropriate resources  Description: INTERVENTIONS:  - Identify barriers to discharge w/pt and caregiver  - Include patient/family/discharge partner in discharge planning  - Arrange for needed discharge resources and transportation as appropriate  - Identify discharge learning needs (meds, wound care, etc)  - Arrange for interpreters to assist at  discharge as needed  - Consider post-discharge preferences of patient/family/discharge partner  - Complete POLST form as appropriate  - Assess patient's ability to be responsible for managing their own health  - Refer to Case Management Department for coordinating discharge planning if the patient needs post-hospital services based on physician/LIP order or complex needs related to functional status, cognitive ability or social support system  Outcome: Progressing     Problem: Diabetes/Glucose Control  Goal: Glucose maintained within prescribed range  Description: INTERVENTIONS:  - Monitor Blood Glucose as ordered  - Assess for signs and symptoms of hyperglycemia and hypoglycemia  - Administer ordered medications to maintain glucose within target range  - Assess barriers to adequate nutritional intake and initiate nutrition consult as needed  - Instruct patient on self management of diabetes  Outcome: Progressing

## 2025-03-04 NOTE — PHYSICAL THERAPY NOTE
PHYSICAL THERAPY KNEE TREATMENT NOTE - INPATIENT     Room Number: 412/412-A       Presenting Problem: admitted for BKA revision 3/1/25 due to osteomyelitis of the distal tibia  Co-Morbidities : DM, L BKA     Problem List  Principal Problem:    Leg swelling  Active Problems:    Abscess    PHYSICAL THERAPY ASSESSMENT   Patient demonstrates good  progress this session, goals  remain in progress.      Patient is requiring supervision and stand-by assist as a result of the following impairments: medical status.     Patient continues to function at baseline with bed mobility, transfers, gait, stair negotiation, maintaining seated position, standing prolonged periods, and performing household tasks.  Next session anticipate patient to progress bed mobility, transfers, gait, stair negotiation, maintaining seated position, standing prolonged periods, and performing household tasks.  Physical Therapy will continue to follow patient for duration of hospitalization.    Patient continues to benefit from continued skilled PT services: at discharge to promote prior level of function.  Anticipate patient will return home with home health PT.    PLAN DURING HOSPITALIZATION  Nursing Mobility Recommendation : 1 Assist  PT Device Recommendation: Rolling walker  PT Treatment Plan: Bed mobility, Body mechanics, Coordination, Endurance, Energy conservation, Patient education, Gait training, Neuromuscular re-educate, Range of motion, Strengthening, Stair training, Balance training  Frequency (Obs): 3-5x/week     SUBJECTIVE  \"I got 3 food trucks\"    OBJECTIVE  Precautions: none    WEIGHT BEARING STATUS  L Lower Extremity: Non-Weight Bearing    PAIN ASSESSMENT   Ratin  Location: denies  Management Techniques: Activity promotion, Body mechanics, Breathing techniques, Relaxation, Repositioning    BALANCE  Static Sitting: Good  Dynamic Sitting: Good  Static Standing: Fair +  Dynamic Standing: Fair    AM-PAC '6-Clicks' INPATIENT SHORT  FORM - BASIC MOBILITY  How much difficulty does the patient currently have...  Patient Difficulty: Turning over in bed (including adjusting bedclothes, sheets and blankets)?: A Little   Patient Difficulty: Sitting down on and standing up from a chair with arms (e.g., wheelchair, bedside commode, etc.): A Little   Patient Difficulty: Moving from lying on back to sitting on the side of the bed?: A Little   How much help from another person does the patient currently need...   Help from Another: Moving to and from a bed to a chair (including a wheelchair)?: A Little   Help from Another: Need to walk in hospital room?: A Little   Help from Another: Climbing 3-5 steps with a railing?: A Little     AM-PAC Score:  Raw Score: 18   Approx Degree of Impairment: 46.58%   Standardized Score (AM-PAC Scale): 43.63   CMS Modifier (G-Code): CK    FUNCTIONAL ABILITY STATUS  Functional Mobility/Gait Assessment  Gait Assistance:  (SBA)  Distance (ft): 18  Assistive Device: Rolling walker  Pattern: Within Functional Limits (Pt with L BKA, ambulates with RW via hopping on RLE)  Rolling: supervision  Supine to Sit: supervision  Sit to Supine: supervision  Sit to Stand: stand-by assist    Skilled Therapy Provided: Patient received supine in bed at initiation of session agreeable to participation in PT. Patient tolerates treatment well. Pt states that he does not need therapy and that he feels confident in his physical abilities. Reports no safety concerns regarding stair negotiation. Pt performs bed mobility and STS with supervision and RW. Pt ambulates 18ft with RW and SBA via RLE hopping. Demonstrates improvement in ambulation tolerance and decreased level of physical assistance for functional mobility in comparison to previous session. Patient educated on general safety, POC and DC recs, verbalized understanding. Patient left supine in bed, lines intact, needs in reach and handoff to RN.    The patient's Approx Degree of Impairment:  46.58% has been calculated based on documentation in the [facility] '6 clicks' Inpatient Basic Mobility Short Form.  Research supports that patients with this level of impairment may benefit from  PT.  Final disposition will be made by interdisciplinary medical team.    Patient End of Session: In bed, Needs met, Call light within reach, RN aware of session/findings, All patient questions and concerns addressed, Hospital anti-slip socks    CURRENT GOALS  Goals to be met by: 3/16/25  Patient Goal Patient's self-stated goal is: to return home, be able to walk   Goal #1 Patient is able to demonstrate supine - sit EOB @ level: modified independent     Goal #1   Current Status progressing   Goal #2 Patient is able to demonstrate transfers EOB to/from Chair/Wheelchair at assistance level: modified independent with rolling walker     Goal #2  Current Status progressing   Goal #3 Patient is able to ambulate 50 ft feet with assist device: rolling walker at assistance level: stand by assist   Goal #3   Current Status progressing   Goal #4 Patient will be independent with scooter mobility   Goal #4   Current Status progressing   Goal #5 Patient to demonstrate independence with home activity/exercise instructions provided to patient in preparation for discharge.   Goal #5   Current Status progressing     Therapeutic Activity: 10 minutes    John Moreno, SPT

## 2025-03-04 NOTE — DISCHARGE SUMMARY
[facility]  part of [facility]    Discharge Summary    Alejandro Mireles Jr. Patient Status:  Inpatient    1975 MRN [id number]   Location [facility] 4W/SW/SE Attending Mary Keller MD   Hosp Day # 9 PCP Fernanda Champan MD     Date of Admission: 2025   Date of Discharge: 3/4/2025    Admitting Diagnosis: Abscess [L02.91]  Leg swelling [M79.89]    Disposition: Home    Discharge Diagnosis: .Principal Problem:    Leg swelling  Active Problems:    Abscess      Hospital Course:   Reason for Admission: Wound infection    Discharge Physical Exam: General appearance: alert, appears stated age, and cooperative  Head: Normocephalic, without obvious abnormality, atraumatic  Pulmonary:  clear to auscultation bilaterally  Cardiovascular: S1, S2 normal, no murmur, click, rub or gallop, regular rate and rhythm, S1, S2 normal  Abdominal: soft, non-tender; bowel sounds normal; no masses,  no organomegaly    Hospital Course: Pt treated with IV abx, had LBKA revision done DC home on abx after PICC line    Complications: None    Consultants         Provider   Role Specialty     Najjar, Samer F, MD      Consulting Physician SURGERY, VASCULAR     Jose Elias Bustos MD      Consulting Physician INTERVENTIONAL, RADIOLOGY     Jay William MD      Consulting Physician INFECTIOUS DISEASES     Mary Keller MD      Consulting Physician Internal Medicine          Surgical Procedures       Case IDs Date Procedure Surgeon Location Status    [id number] 3/1/25 Left below knee amputation revision Najjar, Samer F, MD [facility] MAIN OR Comp          Pending Labs       Order Current Status    Specimen to Pathology Tissue In process    Anaerobic Culture Preliminary result            Discharge Plan:   Discharge Condition: Stable    Current Discharge Medication List        New Orders    Details   HYDROcodone-acetaminophen 5-325 MG Oral Tab Take 1 tablet by mouth every 4 (four) hours as needed.           Home Meds -  Unchanged    Details   losartan 50 MG Oral Tab Take 1 tablet (50 mg total) by mouth daily.      amLODIPine 5 MG Oral Tab Take 1 tablet (5 mg total) by mouth daily.      insulin NPH & regular 70/30 (70-30) 100 Units/mL Subcutaneous Suspension Inject 20 Units into the skin Before Dinner.      atorvastatin 40 MG Oral Tab Take 1 tablet (40 mg total) by mouth daily.      insulin aspart 100 Units/mL Injection Solution Inject 20 Units into the skin 2 (two) times daily before meals.      Multiple Vitamin (ONE-A-DAY MENS OR) Take by mouth daily.                 Discharge Diet: As tolerated    Discharge Activity: As tolerated    Follow up:     D/W RN  Time spent 35 mins        FELICITA KISER, MD PATRICK  3/4/2025

## 2025-03-04 NOTE — PROGRESS NOTES
INFECTIOUS DISEASE PROGRESS NOTE  [facility]  part of [facility] ID PROGRESS NOTE    Alejandro Mireles Jr. Patient Status:  Inpatient    1975 MRN [id number]   Location [facility] 4W/SW/SE Attending Mary Keller MD   Hosp Day # 9 PCP Fernanda Chapman MD     Subjective:  ROS reviewed. Feels well. Tolerating abx. PICC To be placed.    ASSESSMENT:    Antibiotics: Vancomycin  Ancef, Cefepime     # L BKA stump abscess with concerns for chronic OM with sinus tract s/p OR 3/1 for revision, cx MRSA               -  MRI  with distal tibia OM involving osteotomy margin + abscess (1.9 x 1.8cm) w/ sinus tract to skin, s/p bedside aspiration, cx ngtd; drainage cx- S.agalactiae, diptheroids               -  Refused IV abx given hx PICC assc DVT, s/p augmentin through                - Seen again on doppler US  (4.8 x 5.3 x 4.5cm)   -S/p IR aspiration , cx GBS   -Vascular surgery planning revision 3/1  # Hx R TMA abscess 2024- s/p I&D, ertapenem, dc 24  # Hx BLE OM (s/p L BKA , R TMA )  # DM, CKD  # HTN      PLAN:  -  Stop vancomycin and start daptomycin x 4 week course (EOT 3/31/25). PICC to be placed.  -  Local wound care.   -  Follow fever curve, wbc.  -  Reviewed labs, micro, imaging reports, available old records.  -  Case d/w patient, RN.     History of Present Illness:  49 year old male with PMH HTN, DM w/neuropathy/nephropathy, PAD, LLE OM (s/p L BKA ), R forefoot OM (s/p R TMA ), c/b R TMA OM/abscess (s/p I&D 24, ertapenem (EOT 24)c/b PICC assc RUE DVT))     Pt admitted [facility] 2024 with L BKA stump OM and abscess s/p bedside aspiration, cx ngtd. Admit drainage cx w/S.agalactiae and diptheroids. Was on zosyn. Pt was unwilling to undergo another course of iv abx d/t hx DVT and thus DC on augmentin through 25. Now presents to [facility] ER  with L stump swelling. He believes that these recurrent infections keep  happening because of excessive sweating while wearing his prosthetic. There was also a scab on his stump that he recently picked off. Endorses stump associated pain and swelling but reported drainage. No fevers or chills. Pt does not want any invasive surgical procedures or iv antibiotics. On admit, no fever. WBC 9.9. LLE dopplers neg for DVT but with abscess (4.8 x 5.3 x 4.5 cm). Bcx sent. Pt given 1x dose vancomycin + zosyn. ID consulted.     Physical Exam:  BP (!) 142/96 (BP Location: Right arm)   Pulse 90   Temp 98.5 °F (36.9 °C) (Oral)   Resp 18   Ht 6' 3\" (1.905 m)   Wt 278 lb (126.1 kg)   SpO2 95%   BMI 34.75 kg/m²     Gen:   Awake, in bed  HEENT:  EOMI, neck supple  CV/lungs:  RRR, lungs clear in all fields  Abdom:  Soft, no TTP  Skin/extrem:  L BKA stump with dressing intact  Lines:  PIV+    Laboratory Data: Reviewed    Microbiology: Reviewed    Radiology: Reviewed      SUSAN [facility]  [phone]  3/4/2025

## 2025-03-04 NOTE — PROGRESS NOTES
[facility]  part of [facility]     Vascular Surgery Progress Note    Alejandro Carbajal Chi Suarez. Patient Status:  Inpatient    1975 MRN [id number]   Location [facility] 4W/SW/SE Attending Mary Keller MD   Hosp Day # 9 PCP Fernanda Chapman MD     Objective:   Temp: 98.5 °F (36.9 °C)  Pulse: 90  Resp: 18  BP: 142/96    Intake/Output:     Intake/Output Summary (Last 24 hours) at 3/4/2025 1006  Last data filed at 3/4/2025 0402  Gross per 24 hour   Intake 580 ml   Output 601 ml   Net -21 ml         Events Yesterday/Overnight:  S/p revision of left below-knee amputation with re-amputation of the tibia 3/01/25.   Doing well.  Pain is well controlled.   Continues on IV Vancomycin.     Exam:  Dressing on left stump is dry and intact.     Impression/Plan:  Doing well post-operatively.   He is scheduled for PICC line placement today around 2 pm.  Can be discharged from vascular standpoint.  Follow up with Dr. Najjar / Mckenna MURPHY in 3 weeks.         Medications:  Current Facility-Administered Medications   Medication Dose Route Frequency    vancomycin (Vancocin) 1.5 g in sodium chloride 0.9% 250mL IVPB premix  1,500 mg Intravenous Q24H    diphenhydrAMINE (Benadryl) 50 mg/mL  injection 50 mg  50 mg Intravenous Q6H PRN    acetaminophen (Tylenol) tab 650 mg  650 mg Oral Q4H PRN    Or    HYDROcodone-acetaminophen (Norco) 5-325 MG per tab 1 tablet  1 tablet Oral Q4H PRN    Or    HYDROcodone-acetaminophen (Norco) 5-325 MG per tab 2 tablet  2 tablet Oral Q4H PRN    ondansetron (Zofran) 4 MG/2ML injection 4 mg  4 mg Intravenous Q6H PRN    prochlorperazine (Compazine) 10 MG/2ML injection 5 mg  5 mg Intravenous Q8H PRN    enoxaparin (Lovenox) 40 MG/0.4ML SUBQ injection 40 mg  40 mg Subcutaneous Daily    HYDROmorphone (Dilaudid) 1 MG/ML injection 0.2 mg  0.2 mg Intravenous Q2H PRN    Or    HYDROmorphone (Dilaudid) 1 MG/ML injection 0.4 mg  0.4 mg Intravenous Q2H PRN    Or    HYDROmorphone (Dilaudid)  1 MG/ML injection 0.8 mg  0.8 mg Intravenous Q2H PRN    HYDROmorphone (Dilaudid) 1 MG/ML injection 1.2 mg  1.2 mg Intravenous Q4H PRN    amLODIPine (Norvasc) tab 5 mg  5 mg Oral Daily    atorvastatin (Lipitor) tab 40 mg  40 mg Oral Nightly    losartan (Cozaar) tab 50 mg  50 mg Oral Daily    multivitamin (Tab-A-Michael/Beta Carotene) tab 1 tablet  1 tablet Oral Daily    insulin aspart (NovoLOG) 100 Units/mL FlexPen 20 Units  20 Units Subcutaneous BID with meals    ondansetron (Zofran) 4 MG/2ML injection 4 mg  4 mg Intravenous Q6H PRN    metoclopramide (Reglan) 5 mg/mL injection 5 mg  5 mg Intravenous TID PRN    insulin aspart (NovoLOG) 100 Units/mL FlexPen 1-5 Units  1-5 Units Subcutaneous TID CC and HS       Laboratory/Data:    LABS:  Recent Labs   Lab 02/27/25  0456 02/28/25  0434 03/02/25  0527 03/03/25  0522   WBC 7.1 7.8 10.1 9.5   HGB 11.8* 12.5* 11.5* 10.7*   MCV 80.4 80.3 81.7 81.9   .0 357.0 355.0 361.0       Recent Labs   Lab 02/26/25  1021 02/27/25  0456 02/28/25  0434 03/02/25  0518 03/03/25  0522    140 139 137 136   K 4.5 4.7 4.5 5.2* 4.9    104 104 107 104   CO2 27.0 29.0 28.0 24.0 25.0   BUN 19 24* 23 29* 35*   CREATSERUM 1.82* 1.82* 1.90* 3.04* 3.07*   * 131* 142* 217* 194*   CA 8.7 8.6* 8.5* 8.0* 8.0*     No results for input(s): \"PTP\", \"INR\", \"PTT\" in the last 168 hours.  No results for input(s): \"ALT\", \"AST\", \"ALB\", \"AMYLASE\", \"LIPASE\", \"LDH\" in the last 168 hours.    Invalid input(s): \"ALPHOS\", \"TBIL\", \"DBIL\", \"TPROT\"  No results for input(s): \"TROP\" in the last 168 hours.  No results found for: \"ANAS\", \"HORACIO\", \"ANASCRN\"  No results for input(s): \"PCACT\", \"PSACT\", \"AT3ACT\", \"HIPAB\", \"PATHI\", \"STALA\", \"DRVVTRATIO\", \"DRVVT\", \"STACLOT\", \"CARIGG\", \"[id number]\", \"[id number]\", \"RA\", \"HAVIGM\", \"HBCIGM\", \"HCVAB\", \"HBSAG\", \"HBCAB\", \"HBVDNAINTERP\", \"ANAS\", \"C3\", \"C4\" in the last 168 hours.    KATHERINE Escalante  Division of Vascular Surgery.

## 2025-03-10 ENCOUNTER — TELEPHONE (OUTPATIENT)
Facility: CLINIC | Age: 50
End: 2025-03-10

## 2025-03-10 NOTE — TELEPHONE ENCOUNTER
Patient called to find out when he needs to have his stitches out or when he needs to be seen in office.  Please call.

## 2025-03-13 ENCOUNTER — APPOINTMENT (OUTPATIENT)
Dept: PICC SERVICES | Facility: HOSPITAL | Age: 50
End: 2025-03-13
Attending: EMERGENCY MEDICINE
Payer: MEDICARE

## 2025-03-13 ENCOUNTER — HOSPITAL ENCOUNTER (EMERGENCY)
Facility: HOSPITAL | Age: 50
Discharge: HOME | End: 2025-03-13
Attending: EMERGENCY MEDICINE
Payer: MEDICARE

## 2025-03-13 VITALS
SYSTOLIC BLOOD PRESSURE: 155 MMHG | OXYGEN SATURATION: 97 % | TEMPERATURE: 99 F | DIASTOLIC BLOOD PRESSURE: 97 MMHG | HEART RATE: 91 BPM | RESPIRATION RATE: 20 BRPM

## 2025-03-13 DIAGNOSIS — T82.898A OCCLUDED PICC LINE, INITIAL ENCOUNTER: Primary | ICD-10-CM

## 2025-03-13 LAB — GLUCOSE BLDC GLUCOMTR-MCNC: 129 MG/DL (ref 70–99)

## 2025-03-13 PROCEDURE — 36569 INSJ PICC 5 YR+ W/O IMAGING: CPT

## 2025-03-13 PROCEDURE — 99282 EMERGENCY DEPT VISIT SF MDM: CPT

## 2025-03-13 PROCEDURE — 99284 EMERGENCY DEPT VISIT MOD MDM: CPT

## 2025-03-13 PROCEDURE — 82962 GLUCOSE BLOOD TEST: CPT

## 2025-03-13 RX ORDER — LIDOCAINE HYDROCHLORIDE 10 MG/ML
5 INJECTION, SOLUTION EPIDURAL; INFILTRATION; INTRACAUDAL; PERINEURAL
Status: COMPLETED | OUTPATIENT
Start: 2025-03-13 | End: 2025-03-13

## 2025-03-13 NOTE — ED QUICK NOTES
PICC nurse called and notified for need for PICC line replacement.  Pt started on daptomycin on 3/4, to be continued until 3/31  Consult to vascular access team placed

## 2025-03-13 NOTE — ED PROVIDER NOTES
Patient Seen in: [facility] Emergency Department      History     Chief Complaint   Patient presents with    Cath Tube Problem            Stated Complaint: PICC line dislodged at home    Subjective:   HPI      49-year-old male with history of diabetes, prior DVT, and left leg BKA status post hospitalization from 2/22 - 3/4 for a left leg abscess presents after his PICC line came dislodged.  The patient had revision of his BKA during his hospitalization.  He was discharged home with a PICC line and receives daily daptomycin since his discharge.  He reports that while in the shower 2 days ago the tape securing his PICC line came loose despite wearing a plastic bag over the line.  He then went to sleep and awoke yesterday morning with the majority of the line dislodged.  He called his doctor and was advised to come to the ED for PICC line replacement.  He was unable to come in yesterday and presents today.  He did not receive his antibiotics yesterday or today.  He is otherwise without complaints.  He denies any leg or wound pain.  He denies any left arm pain where the PICC line is located.    Objective:     Past Medical History:    Deep vein thrombosis (HCC)    from PICC line; no anticoagulants    Diabetes (HCC)    Visual impairment    L eye; glasses              Past Surgical History:   Procedure Laterality Date    Below knee leg amputation Left 2019    estimated                Social History     Socioeconomic History    Marital status: Legally    Tobacco Use    Smoking status: Never    Smokeless tobacco: Never   Vaping Use    Vaping status: Never Used   Substance and Sexual Activity    Alcohol use: Not Currently    Drug use: Never     Social Drivers of Health     Food Insecurity: No Food Insecurity (2/23/2025)    NCSS - Food Insecurity     Worried About Running Out of Food in the Last Year: No     Ran Out of Food in the Last Year: No   Transportation Needs: No Transportation Needs (2/23/2025)     NCSS - Transportation     Lack of Transportation: No   Housing Stability: Not At Risk (2/23/2025)    NCSS - Housing/Utilities     Has Housing: Yes     Worried About Losing Housing: No     Unable to Get Utilities: No                  Physical Exam     ED Triage Vitals [03/13/25 0953]   BP (!) 157/92   Pulse 95   Resp 18   Temp 98.6 °F (37 °C)   Temp src    SpO2 98 %   O2 Device        Current Vitals:   Vital Signs  BP: (!) 157/92  Pulse: 95  Resp: 18  Temp: 98.6 °F (37 °C)    Oxygen Therapy  SpO2: 98 %        Physical Exam  General Appearance: Well-appearing  Eyes: pupils equal and round no injection  Respiratory: chest is non tender, lungs are clear to auscultation  Cardiac: regular rate and rhythm  Gastrointestinal: abdomen is soft and non tender, no masses, bowel sounds normal  Musculoskeletal: neck is supple and non tender         Left BKA with bandage in place.  Left arm with PICC line.  The majority of the PICC line is out, coiled up, and taped to the arm.  No erythema, warmth, or drainage.  No tenderness to the arm or forearm.  Bilateral radial pulses intact and symmetric.  Skin: no rashes or lesions    ED Course   Labs Reviewed - No data to display              MDM      PICC line read placed by the PICC line staff.  Patient offered a dose of his antibiotics here but he states he will go home and self administer.  He is advised to follow-up with his infectious disease doctor and vascular surgeon as scheduled.  He is advised to return if any new problems develop.        Medical Decision Making      Disposition and Plan     Clinical Impression:  1. Occluded PICC line, initial encounter         Disposition:  Discharge  3/13/2025 11:25 am    Follow-up:  Suzanne Calderón PA-C  [address]  [facility] [address]  [facility] [address]  [phone]    Follow up      Najjar, Samer F, MD  [address] [facility]  [address]  [facility] [id number] [phone]    Follow up            Medications Prescribed:  Current Discharge Medication  List              Supplementary Documentation:

## 2025-03-13 NOTE — ED INITIAL ASSESSMENT (HPI)
Patient arrives to the ER complaining of his PICC line being dislodged. Patient does not know what happened but woke up with a large amount of his picc line hanging out. No other complaints.

## 2025-03-13 NOTE — ED QUICK NOTES
Patient discharged home in no acute distress. A&Ox4, skin p/w/d, denies cp/sob. Ambulating with steady gait and verbalized understanding of d/c instructions. Verbalized he would administer Dapto at home. All questions answered.

## 2025-03-13 NOTE — DISCHARGE INSTRUCTIONS
You may use the PICC line as before.  Follow-up with infectious disease and your vascular surgeon as scheduled.  Return to the emergency department if any new problems develop.

## 2025-03-18 ENCOUNTER — OFFICE VISIT (OUTPATIENT)
Facility: CLINIC | Age: 50
End: 2025-03-18
Payer: MEDICARE

## 2025-03-18 VITALS — BODY MASS INDEX: 34.65 KG/M2 | WEIGHT: 270 LBS | HEIGHT: 74 IN

## 2025-03-18 DIAGNOSIS — Z89.512 STATUS POST BELOW KNEE AMPUTATION, LEFT (HCC): Primary | ICD-10-CM

## 2025-03-18 NOTE — PROGRESS NOTES
VASCULAR SURGERY OFFICE NOTE         Name: Alejandro Mireles Jr.   : 1975  [id number]     REASON FOR VISIT:   Patient is a 49 year old male who is here for his post op visit s/p revision of the left below-knee amputation with re-amputation of the tibia.  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 Has done well at home.  He reports minimal incisional pain. Denies any erythema or drainage from the incision. Continues on IV Daptomycin therapy through PICC line.      PAST MEDICAL HISTORY:     Past Medical History:    Deep vein thrombosis (HCC)    from PICC line; no anticoagulants    Diabetes (HCC)    Visual impairment    L eye; glasses       PAST SURGICAL HISTORY:     Past Surgical History:   Procedure Laterality Date    Below knee leg amputation Left     estimated        MEDICATIONS:     Current Outpatient Medications   Medication Sig Dispense Refill    HYDROcodone-acetaminophen 5-325 MG Oral Tab Take 1 tablet by mouth every 4 (four) hours as needed. 20 tablet 0    DAPTOmycin 50 mg/mL in sodium chloride 0.9% PF Inject 16 mL (800 mg total) into the vein daily for 27 days. Weekly CBC/diff, CMP, ESR, CRP, CK M86.162 432 mL 0    losartan 50 MG Oral Tab Take 1 tablet (50 mg total) by mouth daily.      amLODIPine 5 MG Oral Tab Take 1 tablet (5 mg total) by mouth daily.      insulin NPH & regular 70/30 (70-30) 100 Units/mL Subcutaneous Suspension Inject 20 Units into the skin Before Dinner.      atorvastatin 40 MG Oral Tab Take 1 tablet (40 mg total) by mouth daily.      insulin aspart 100 Units/mL Injection Solution Inject 20 Units into the skin 2 (two) times daily before meals.      Multiple Vitamin (ONE-A-DAY MENS OR) Take by mouth daily.         EXAM:   There were no vitals taken for this visit.  The left below-knee amputation stump surgical incision is dry with no oozing or purulence noted.   Small wound dehiscence at the center of the  surgical incision.   No edema or surrounding erythema present.     ASSESSMENT    There are no diagnoses linked to this encounter.    The patient is s/p revision of the left below-knee amputation with re-amputation of the tibia.   Doing well post-operatively.   All sutures were removed from the surgical incision.  Hydrogel and Allevin foam dressing were applied to the small opened area on the central aspect of the incision.   The patient was instructed to wear the stump  on a daily basis.   No prosthesis use is allowed at this time.  Follow up with Dr. Najjar in 4 weeks.     PLAN:   As above.     KATHERINE Escalante  Division of Vascular Surgery.

## 2025-04-11 ENCOUNTER — TELEPHONE (OUTPATIENT)
Facility: CLINIC | Age: 50
End: 2025-04-11

## 2025-04-11 NOTE — TELEPHONE ENCOUNTER
Appt for home health made in error.    Telephone encounter created for correct information.   Eloina

## 2025-04-11 NOTE — TELEPHONE ENCOUNTER
Discharge records received from [facility] received by fax.  Signed by provider and sent to scanning.   Eloina

## 2025-04-21 ENCOUNTER — TELEPHONE (OUTPATIENT)
Facility: CLINIC | Age: 50
End: 2025-04-21

## 2025-04-21 NOTE — TELEPHONE ENCOUNTER
Phone call to the patient 4- at 1-25.  He stated he used [facility] for his PIC Line treatment and it has been removed.  He no longer needs the service.  I called [facility] [phone].  I reached the voice mail for Marian and I left a message with our call back number.    Eloina

## (undated) DEVICE — ENCORE® LATEX MICRO SIZE 8, STERILE LATEX POWDER-FREE SURGICAL GLOVE: Brand: ENCORE

## (undated) DEVICE — CLIP SM W INTNL HRZN TI TPE LF

## (undated) DEVICE — SOLUTION PREP 30ML 5% POVIDONE IOD EYE BDINE

## (undated) DEVICE — PACK SRG BIOM FULL DRP STRL

## (undated) DEVICE — BLADE SAW 0.98X2.76IN THK0.025IN CUT

## (undated) DEVICE — PAD,ABDOMINAL,8"X7.5",STERILE,LF,1/PK: Brand: MEDLINE

## (undated) DEVICE — DRAIN JACKSON PRATT 10FR 1/8END: Brand: CARDINAL HEALTH

## (undated) DEVICE — Device: Brand: XPERIENCE

## (undated) DEVICE — APPLICATOR SWAB L3IN GEN PURP COT TIP WOOD

## (undated) DEVICE — ANTIBACTERIAL UNDYED BRAIDED (POLYGLACTIN 910), SYNTHETIC ABSORBABLE SUTURE: Brand: COATED VICRYL

## (undated) DEVICE — SOLUTION IRRIG 1000ML ST H2O AQUALITE PLAS

## (undated) DEVICE — EVACUATOR SUR 100CC SIL BLB WND

## (undated) DEVICE — WECK HORIZON MED BLUE CLIP DISP

## (undated) DEVICE — ENCORE® LATEX MICRO SIZE 7, STERILE LATEX POWDER-FREE SURGICAL GLOVE: Brand: ENCORE

## (undated) DEVICE — Device

## (undated) DEVICE — SOLUTION IRRIG 500ML BAL SALT 2 CHMBR GLS BSS

## (undated) DEVICE — SOLUTION IRRIG 1000ML 0.9% NACL USP BTL

## (undated) DEVICE — BANDAGE,GAUZE,BULKEE II,4.5"X4.1YD,STRL: Brand: MEDLINE

## (undated) DEVICE — STERILE POLYISOPRENE POWDER-FREE SURGICAL GLOVES: Brand: PROTEXIS

## (undated) DEVICE — SHEET,DRAPE,70X100,STERILE: Brand: MEDLINE

## (undated) DEVICE — SPONGE LAP 18X18IN WHT COT 4 PLY FLD STRUNG

## (undated) DEVICE — SKIN PREP TRAY 4 COMPARTM TRAY: Brand: MEDLINE INDUSTRIES, INC.

## (undated) DEVICE — PROXIMATE RH ROTATING HEAD SKIN STAPLERS (35 WIDE) CONTAINS 35 STAINLESS STEEL STAPLES: Brand: PROXIMATE

## (undated) DEVICE — SOLUTION IRRIG 250ML 0.9% NACL

## (undated) DEVICE — REVOLUTION DSP 25G ILM FORCEPS: Brand: ALCON GRIESHABER REVOLUTION

## (undated) DEVICE — Device: Brand: JELCO

## (undated) DEVICE — PACK CDS LOWER EXTREMITY

## (undated) DEVICE — 25GA SOFT TIP CANNULA: Brand: SYNERGETICS

## (undated) DEVICE — STANDARD HYPODERMIC NEEDLE,POLYPROPYLENE HUB: Brand: MONOJECT

## (undated) DEVICE — SUT ETHLN 2-0 18IN FS NABSRB BLK 26MM 3/8 CIR

## (undated) DEVICE — KIT 25+GA VITRECTOMY

## (undated) DEVICE — RETINAL: Brand: MEDLINE INDUSTRIES, INC.

## (undated) DEVICE — 3M™ MICROFOAM™ SURGICAL TAPE, 2 INCHES X 5 YARDS, 6 ROLLS/CARTON, 6 CARTONS/CASE, 1528-2: Brand: 3M™ MICROFOAM™

## (undated) DEVICE — CONTAINER SPEC COLL AND TRNSPRT W/ WHT CAP

## (undated) DEVICE — INTENDED FOR TISSUE SEPARATION, AND OTHER PROCEDURES THAT REQUIRE A SHARP SURGICAL BLADE TO PUNCTURE OR CUT.: Brand: BARD-PARKER ® STAINLESS STEEL BLADES

## (undated) DEVICE — SUT VCRL 2-0 36IN CT-1 ABSRB UD L36MM 1/2 CIR

## (undated) NOTE — LETTER
[facility]  Administration of Anesthesia  I, Alejandro Solomon Mireles Jr. agree to be cared for by a physician anesthesiologist alone and/or with a nurse anesthetist, who is specially trained to monitor me and give me medicine to put me to sleep or keep me comfortable during my procedure    I understand that my anesthesiologist and/or anesthetist is not an employee or agent of [facility] or [employer]. He or she works for [facility]    As the patient asking for anesthesia services, I agree to:  Allow the anesthesiologist (anesthesia doctor) to give me medicine and do additional procedures as necessary. Some examples are: Starting or using an “IV” to give me medicine, fluids or blood during my procedure, and having a breathing tube placed to help me breathe when I’m asleep (intubation). In the event that my heart stops working properly, I understand that my anesthesiologist will make every effort to sustain my life, unless otherwise directed by [facility] Do Not Resuscitate documents.  Tell my anesthesia doctor before my procedure:  If I am pregnant.  The last time that I ate or drank.  iii. All of the medicines I take (including prescriptions, herbal supplements, and pills I can buy without a prescription (including street drugs/illegal medications). Failure to inform my anesthesiologist about these medicines may increase my risk of anesthetic complications.  iv.If I am allergic to anything or have had a reaction to anesthesia before.  I understand how the anesthesia medicine will help me (benefits).  I understand that with any type of anesthesia medicine there are risks:  The most common risks are: nausea, vomiting, sore throat, muscle soreness, damage to my eyes, mouth, or teeth (from breathing tube placement).  Rare risks include: remembering what happened during my procedure, allergic reactions to medications, injury to my airway, heart, lungs, vision,  nerves, or muscles and in extremely rare instances death.  My doctor has explained to me other choices available to me for my care (alternatives).  Pregnant Patients (“epidural”):  I understand that the risks of having an epidural (medicine given into my back to help control pain during labor), include itching, low blood pressure, difficulty urinating, headache or slowing of the baby’s heart. Very rare risks include infection, bleeding, seizure, irregular heart rhythms and nerve injury.  Regional Anesthesia (“spinal”, “epidural”, & “nerve blocks”):  I understand that rare but potential complications include headache, bleeding, infection, seizure, irregular heart rhythms, and nerve injury.    _____________________________________________________________________________  Patient (or Representative) Signature/Relationship to Patient  Date   Time    _____________________________________________________________________________   Name (if used)    Language/Organization   Time    _____________________________________________________________________________  Nurse Anesthetist Signature     Date   Time  _____________________________________________________________________________  Anesthesiologist Signature     Date   Time  I have discussed the procedure and information above with the patient (or patient’s representative) and answered their questions. The patient or their representative has agreed to have anesthesia services.    _____________________________________________________________________________  Witness        Date   Time  I have verified that the signature is that of the patient or patient’s representative, and that it was signed before the procedure  Patient Name: Alejandro Carbajal Chi Lux     : 1975                 Printed: 3/1/2025 at 7:24 AM    Medical Record #: [id number]                                            Page 1 of 1  ----------ANESTHESIA CONSENT----------

## (undated) NOTE — LETTER
[facility]  part of [facility]     PICC INSERTION CONSENT     I agree to have a Peripherally Inserted Central Catheter (PICC) placed in my arm.   1. The PICC insertion procedure, care, maintenance, risks, benefits, and complications have been explained to me by my physician, ________________________, and I understand them.   2. I understand that this may not be the only way I can receive my medication. I understand that my physician has determined that the PICC would be the safest and most effective means of administering my medication at this time. If there are other options of giving medication into my veins those options have been explained to me by my physician and I have chosen this one.   3. I realize a nurse who has been specially trained and certified by the hospital and ’s representative to insert a PICC will perform this procedure. My catheter will be inserted by _____________________________.   4. I have been informed by my doctor of the nature and purpose of this procedure and the risks involved and the possibility of complications. I realize that this is an invasive procedure and has certain risks such as air embolism (air entering the catheter or my vein), arterial puncture (a tearing of one of my arteries), infection, irregular heartbeat and venous thrombosis (a blood clot in a vein) nerve injury and fracture of the catheter with or without migration.   5. In order to numb the area where the line will be placed, a small amount of anesthetic medication will be injected as ordered by my physician.   6. I understand that while the catheter will be placed in my upper arm the end of the catheter will come to rest in an area near my heart.     7. The person performing this procedure has discussed the potential benefits, risks, and side effects of the PICC; the likelihood of achieving goals; and potential problems that might occur during recuperation. They also discussed  reasonable alternatives to the PICC, including risks, benefits, and side effects related to the alternatives and risks related to not receiving this procedure.    8.  I have expressed any questions about this procedure to my physician or the PICC Proceduralist and he/she has answered them.  I certify that I have read and understand this consent to the insertion of a PICC.      _________________________________________________________   Date     Time     Patient/Guardian Signature       ____________________________________   Printed name of Patient/Guardian          ________________________________________________________________    Date        Time                   Witnessing RN Signature      Patient Name: Alejandro Carbajal Chi Lux     : 1975                 Printed: 2025     Medical Record #: [id number]

## (undated) NOTE — LETTER
[place] [address]  Authorization for Invasive Procedures  Date: 02/24/2025           Time: 1011    I hereby authorize ETHAN Guerra MD, my physician and his/her assistants (if applicable), which may include medical students, residents, and/or fellows, to perform the following surgical operation/ procedure and administer such anesthesia as may be determined necessary by my physician: Fluid collection aspiration with possible drain insertion on Alejandro Mireles Jr.  2.   I recognize that during the surgical operation/procedure, unforeseen conditions may necessitate additional or different procedures than those listed above.  I, therefore, further authorize and request that the above-named surgeon, assistants, or designees perform such procedures as are, in their judgment, necessary and desirable.    3.   My surgeon/physician has discussed prior to my surgery the potential benefits, risks and side effects of this procedure; the likelihood of achieving goals; and potential problems that might occur during recuperation.  They also discussed reasonable alternatives to the procedure, including risks, benefits, and side effects related to the alternatives and risks related to not receiving this procedure.  I have had all my questions answered and I acknowledge that no guarantee has been made as to the result that may be obtained.    4.   Should the need arise during my operation/procedure, which includes change of level of care prior to discharge, I also consent to the administration of blood and/or blood products.  Further, I understand that despite careful testing and screening of blood or blood products by collecting agencies, I may still be subject to ill effects as a result of receiving a blood transfusion and/or blood products.  The following are some, but not all, of the potential risks that can occur: fever and allergic reactions, hemolytic reactions, transmission of diseases such as  Hepatitis, AIDS and Cytomegalovirus (CMV) and fluid overload.  In the event that I wish to have an autologous transfusion of my own blood, or a directed donor transfusion, I will discuss this with my physician.   Check only if Refusing Blood or Blood Products  I understand refusal of blood or blood products as deemed necessary by my physician may have serious consequences to my condition to include possible death. I hereby assume responsibility for my refusal and release the hospital, its personnel, and my physicians from any responsibility for the consequences of my refusal.         o  Refuse         5.   I authorize the use of any specimen, organs, tissues, body parts or foreign objects that may be removed from my body during the operation/procedure for diagnosis, research or teaching purposes and their subsequent disposal by hospital authorities.  I also authorize the release of specimen test results and/or written reports to my treating physician on the hospital medical staff or other referring or consulting physicians involved in my care, at the discretion of the Pathologist or my treating physician.    6.   I consent to the photographing or videotaping of the operations or procedures to be performed, including appropriate portions of my body for medical, scientific, or educational purposes, provided my identity is not revealed by the pictures or by descriptive texts accompanying them.  If the procedure has been photographed/videotaped, the surgeon will obtain the original picture, image, videotape or CD.  The hospital will not be responsible for storage, release or maintenance of the picture, image, tape or CD.    7.   I consent to the presence of a  or observers in the operating room as deemed necessary by my physician or their designees.    8.   I recognize that in the event my procedure results in extended X-Ray/fluoroscopy time, I may develop a skin reaction.    9. If I have a Do Not  Attempt Resuscitation (DNAR) order in place, that status will be suspended while in the operating room, procedural suite, and during the recovery period unless otherwise explicitly stated by me (or a person authorized to consent on my behalf). The surgeon or my attending physician will determine when the applicable recovery period ends for purposes of reinstating the DNAR order.  10. Patients having a sterilization procedure: I understand that if the procedure is successful the results will be permanent and it will therefore be impossible for me to inseminate, conceive, or bear children.  I also understand that the procedure is intended to result in sterility, although the result has not been guaranteed.   11. I acknowledge that my physician has explained sedation/analgesia administration to me including the risk and benefits I consent to the administration of sedation/analgesia as may be necessary or desirable in the judgment of my physician.    I CERTIFY THAT I HAVE READ AND FULLY UNDERSTAND THE ABOVE CONSENT TO OPERATION and/or OTHER PROCEDURE.        ____________________________________       _________________________________      ______________________________  Signature of Patient         Signature of Responsible Person        Printed Name of Responsible Person        ____________________________________      _________________________________      ______________________________       Signature of Witness          Relationship to Patient                       Date                                       Time  Patient Name: Alejandro Carbajal Chi Lux  : 1975    Reviewed: 2024   Printed: 2025  Medical Record #: [id number] Page 1 of 2             STATEMENT OF PHYSICIAN My signature below affirms that prior to the time of the procedure; I have explained to the patient and/or his/her legal representative, the risks and benefits involved in the proposed treatment and any reasonable alternative  to the proposed treatment. I have also explained the risks and benefits involved in refusal of the proposed treatment and alternatives to the proposed treatment and have answered the patient's questions. If I have a significant financial interest in a co-management agreement or a significant financial interest in any product or implant, or other significant relationship used in this procedure/surgery, I have disclosed this and had a discussion with my patient.     _______________________________________________________________ _____________________________  (Signature of Physician)                                                                                         (Date)                                   (Time)  Patient Name: Alejandro Carbajal Chi Lux  : 1975    Reviewed: 2024   Printed: 2025  Medical Record #: [id number] Page 2 of 2

## (undated) NOTE — LETTER
[facility]  Administration of Anesthesia  I, Alejandro Solomon Mireles Jr. agree to be cared for by a physician anesthesiologist alone and/or with a nurse anesthetist, who is specially trained to monitor me and give me medicine to put me to sleep or keep me comfortable during my procedure    I understand that my anesthesiologist and/or anesthetist is not an employee or agent of [facility] or [employer]. He or she works for [facility]    As the patient asking for anesthesia services, I agree to:  Allow the anesthesiologist (anesthesia doctor) to give me medicine and do additional procedures as necessary. Some examples are: Starting or using an “IV” to give me medicine, fluids or blood during my procedure, and having a breathing tube placed to help me breathe when I’m asleep (intubation). In the event that my heart stops working properly, I understand that my anesthesiologist will make every effort to sustain my life, unless otherwise directed by [facility] Do Not Resuscitate documents.  Tell my anesthesia doctor before my procedure:  If I am pregnant.  The last time that I ate or drank.  iii. All of the medicines I take (including prescriptions, herbal supplements, and pills I can buy without a prescription (including street drugs/illegal medications). Failure to inform my anesthesiologist about these medicines may increase my risk of anesthetic complications.  iv.If I am allergic to anything or have had a reaction to anesthesia before.  I understand how the anesthesia medicine will help me (benefits).  I understand that with any type of anesthesia medicine there are risks:  The most common risks are: nausea, vomiting, sore throat, muscle soreness, damage to my eyes, mouth, or teeth (from breathing tube placement).  Rare risks include: remembering what happened during my procedure, allergic reactions to medications, injury to my airway, heart, lungs, vision,  nerves, or muscles and in extremely rare instances death.  My doctor has explained to me other choices available to me for my care (alternatives).  Pregnant Patients (“epidural”):  I understand that the risks of having an epidural (medicine given into my back to help control pain during labor), include itching, low blood pressure, difficulty urinating, headache or slowing of the baby’s heart. Very rare risks include infection, bleeding, seizure, irregular heart rhythms and nerve injury.  Regional Anesthesia (“spinal”, “epidural”, & “nerve blocks”):  I understand that rare but potential complications include headache, bleeding, infection, seizure, irregular heart rhythms, and nerve injury.    _____________________________________________________________________________  Patient (or Representative) Signature/Relationship to Patient  Date   Time    _____________________________________________________________________________   Name (if used)    Language/Organization   Time    _____________________________________________________________________________  Nurse Anesthetist Signature     Date   Time  _____________________________________________________________________________  Anesthesiologist Signature     Date   Time  I have discussed the procedure and information above with the patient (or patient’s representative) and answered their questions. The patient or their representative has agreed to have anesthesia services.    _____________________________________________________________________________  Witness        Date   Time  I have verified that the signature is that of the patient or patient’s representative, and that it was signed before the procedure  Patient Name: Alejandro Carbajal Chi Lux     : 1975                 Printed: 2025 at 3:48 PM    Medical Record #: [id number]                                            Page 1 of 1  ----------ANESTHESIA CONSENT----------

## (undated) NOTE — LETTER
[facility]  part of [facility]     PICC INSERTION CONSENT     I agree to have a Peripherally Inserted Central Catheter (PICC) placed in my arm.   1. The PICC insertion procedure, care, maintenance, risks, benefits, and complications have been explained to me by my physician, ________________________, and I understand them.   2. I understand that this may not be the only way I can receive my medication. I understand that my physician has determined that the PICC would be the safest and most effective means of administering my medication at this time. If there are other options of giving medication into my veins those options have been explained to me by my physician and I have chosen this one.   3. I realize a nurse who has been specially trained and certified by the hospital and ’s representative to insert a PICC will perform this procedure. My catheter will be inserted by _____________________________.   4. I have been informed by my doctor of the nature and purpose of this procedure and the risks involved and the possibility of complications. I realize that this is an invasive procedure and has certain risks such as air embolism (air entering the catheter or my vein), arterial puncture (a tearing of one of my arteries), infection, irregular heartbeat and venous thrombosis (a blood clot in a vein) nerve injury and fracture of the catheter with or without migration.   5. In order to numb the area where the line will be placed, a small amount of anesthetic medication will be injected as ordered by my physician.   6. I understand that while the catheter will be placed in my upper arm the end of the catheter will come to rest in an area near my heart.     7. The person performing this procedure has discussed the potential benefits, risks, and side effects of the PICC; the likelihood of achieving goals; and potential problems that might occur during recuperation. They also discussed  reasonable alternatives to the PICC, including risks, benefits, and side effects related to the alternatives and risks related to not receiving this procedure.    8.  I have expressed any questions about this procedure to my physician or the PICC Proceduralist and he/she has answered them.  I certify that I have read and understand this consent to the insertion of a PICC.      _________________________________________________________   Date     Time     Patient/Guardian Signature       ____________________________________   Printed name of Patient/Guardian          ________________________________________________________________    Date        Time                   Witnessing RN Signature      Patient Name: Alejandro Carbajal Chi Lux     : 1975                 Printed: 2025     Medical Record #: [id number]

## (undated) NOTE — LETTER
[facility]  part of [facility]     PICC INSERTION CONSENT     I agree to have a Peripherally Inserted Central Catheter (PICC) placed in my arm.   1. The PICC insertion procedure, care, maintenance, risks, benefits, and complications have been explained to me by my physician, ________________________, and I understand them.   2. I understand that this may not be the only way I can receive my medication. I understand that my physician has determined that the PICC would be the safest and most effective means of administering my medication at this time. If there are other options of giving medication into my veins those options have been explained to me by my physician and I have chosen this one.   3. I realize a nurse who has been specially trained and certified by the hospital and ’s representative to insert a PICC will perform this procedure. My catheter will be inserted by _____________________________.   4. I have been informed by my doctor of the nature and purpose of this procedure and the risks involved and the possibility of complications. I realize that this is an invasive procedure and has certain risks such as air embolism (air entering the catheter or my vein), arterial puncture (a tearing of one of my arteries), infection, irregular heartbeat and venous thrombosis (a blood clot in a vein) nerve injury and fracture of the catheter with or without migration.   5. In order to numb the area where the line will be placed, a small amount of anesthetic medication will be injected as ordered by my physician.   6. I understand that while the catheter will be placed in my upper arm the end of the catheter will come to rest in an area near my heart.     7. The person performing this procedure has discussed the potential benefits, risks, and side effects of the PICC; the likelihood of achieving goals; and potential problems that might occur during recuperation. They also discussed  reasonable alternatives to the PICC, including risks, benefits, and side effects related to the alternatives and risks related to not receiving this procedure.    8.  I have expressed any questions about this procedure to my physician or the PICC Proceduralist and he/she has answered them.  I certify that I have read and understand this consent to the insertion of a PICC.      _________________________________________________________   Date     Time     Patient/Guardian Signature       ____________________________________   Printed name of Patient/Guardian          ________________________________________________________________    Date        Time                   Witnessing RN Signature      Patient Name: Alejandro Carbajal Chi Lux     : 1975                 Printed: 2025     Medical Record #: [id number]